# Patient Record
Sex: FEMALE | Race: WHITE | NOT HISPANIC OR LATINO
[De-identification: names, ages, dates, MRNs, and addresses within clinical notes are randomized per-mention and may not be internally consistent; named-entity substitution may affect disease eponyms.]

---

## 2018-07-06 ENCOUNTER — RESULT REVIEW (OUTPATIENT)
Age: 43
End: 2018-07-06

## 2018-11-09 ENCOUNTER — RESULT REVIEW (OUTPATIENT)
Age: 43
End: 2018-11-09

## 2019-05-30 ENCOUNTER — EMERGENCY (EMERGENCY)
Facility: HOSPITAL | Age: 44
LOS: 1 days | Discharge: HOME | End: 2019-05-30
Attending: EMERGENCY MEDICINE
Payer: COMMERCIAL

## 2019-05-30 VITALS
HEART RATE: 93 BPM | TEMPERATURE: 98 F | RESPIRATION RATE: 18 BRPM | SYSTOLIC BLOOD PRESSURE: 121 MMHG | OXYGEN SATURATION: 98 % | DIASTOLIC BLOOD PRESSURE: 79 MMHG

## 2019-05-30 VITALS
RESPIRATION RATE: 20 BRPM | HEART RATE: 110 BPM | TEMPERATURE: 98 F | OXYGEN SATURATION: 97 % | SYSTOLIC BLOOD PRESSURE: 126 MMHG | HEIGHT: 63 IN | DIASTOLIC BLOOD PRESSURE: 72 MMHG | WEIGHT: 130.07 LBS

## 2019-05-30 LAB
APPEARANCE UR: CLEAR — SIGNIFICANT CHANGE UP
BACTERIA # UR AUTO: NEGATIVE — SIGNIFICANT CHANGE UP
BILIRUB UR-MCNC: NEGATIVE — SIGNIFICANT CHANGE UP
COLOR SPEC: YELLOW — SIGNIFICANT CHANGE UP
DIFF PNL FLD: NEGATIVE — SIGNIFICANT CHANGE UP
EPI CELLS # UR: 2 /HPF — SIGNIFICANT CHANGE UP
GLUCOSE UR QL: NEGATIVE — SIGNIFICANT CHANGE UP
HYALINE CASTS # UR AUTO: 1 /LPF — SIGNIFICANT CHANGE UP (ref 0–2)
KETONES UR-MCNC: SIGNIFICANT CHANGE UP
LEUKOCYTE ESTERASE UR-ACNC: NEGATIVE — SIGNIFICANT CHANGE UP
NITRITE UR-MCNC: NEGATIVE — SIGNIFICANT CHANGE UP
PH UR: 6.5 — SIGNIFICANT CHANGE UP (ref 5–8)
PROT UR-MCNC: NEGATIVE — SIGNIFICANT CHANGE UP
RBC CASTS # UR COMP ASSIST: 4 /HPF — SIGNIFICANT CHANGE UP (ref 0–4)
SP GR SPEC: 1.01 — LOW (ref 1.01–1.02)
UROBILINOGEN FLD QL: NEGATIVE — SIGNIFICANT CHANGE UP
WBC UR QL: 1 /HPF — SIGNIFICANT CHANGE UP (ref 0–5)

## 2019-05-30 PROCEDURE — 51702 INSERT TEMP BLADDER CATH: CPT

## 2019-05-30 PROCEDURE — 99283 EMERGENCY DEPT VISIT LOW MDM: CPT

## 2019-05-30 PROCEDURE — 99283 EMERGENCY DEPT VISIT LOW MDM: CPT | Mod: 25

## 2019-05-30 PROCEDURE — 81001 URINALYSIS AUTO W/SCOPE: CPT

## 2019-05-30 NOTE — ED PROVIDER NOTE - ATTENDING CONTRIBUTION TO CARE
Pt with acute urinary retention for 4+ hours, worse with time, similar episode in past but not usually this long, due to fibroid.  No fever, vomiting.  Pt in severe distress due to pain.

## 2019-05-30 NOTE — ED ADULT NURSE NOTE - NSIMPLEMENTINTERV_GEN_ALL_ED
Implemented All Universal Safety Interventions:  [place] to call system. Call bell, personal items and telephone within reach. Instruct patient to call for assistance. Room bathroom lighting operational. Non-slip footwear when patient is off stretcher. Physically safe environment: no spills, clutter or unnecessary equipment. Stretcher in lowest position, wheels locked, appropriate side rails in place.

## 2019-05-30 NOTE — ED PROVIDER NOTE - NSFOLLOWUPCLINICS_GEN_ALL_ED_FT
[facility]  Gynceology/OB  [address] [place] [address]  Phone: [phone]  Fax:   Follow Up Time:     [facility]  Urology  [address] [place] [address] [place] [address]  Phone: [phone]  Fax:   Follow Up Time: 1-3 Days

## 2019-05-30 NOTE — ED ADULT NURSE NOTE - OBJECTIVE STATEMENT
43 YOF A&Ox3 with pmh of fibroids presents to ED for urinary retention of 4 hours. Patient states has had history of retention but unlike this. patient denies sob, chest pain, n/v/d, headaches & blurry vision. safety maintained.

## 2019-05-30 NOTE — ED PROVIDER NOTE - OBJECTIVE STATEMENT
42yo F with PMH known large uterine fibroid presenting with urinary retention x 4 hours. Pt reports retention in the past, has gone for and learned how to perform abd massages that typically resolves retention. Today unable to alleviate. Now c/o lower abd discomfort 2/2 retention. No fever/chills, no dysuria/hematuria prior. No kaur for this issue before. 42yo F with PMH known large uterine fibroid presenting with urinary retention x 4 hours. Pt reports retention in the past, has gone for and learned how to perform abd massages that typically resolves retention. Today unable to alleviate. Now c/o lower abd discomfort 2/2 retention. No fever/chills, no dysuria/hematuria prior. No kaur for this issue before. Reports she has been putting off having hysterectomy for many years not wanting to go through it, plans to f/u with GYN.

## 2019-05-30 NOTE — ED ADULT NURSE REASSESSMENT NOTE - NS ED NURSE REASSESS COMMENT FT1
Gaspar catheter placed using sterile technique. Draining to gravity. Secured w/ stat lock. Initial output off approx.  600 cc's urine. Sterile specimens collected and sent to lab. Patient tolerated procedure well. Will cont to monitor.

## 2019-05-30 NOTE — ED PROVIDER NOTE - NSFOLLOWUPINSTRUCTIONS_ED_ALL_ED_FT
Keep kaur catheter in place until follow up with Urologist.   Follow up with Urology upon discharge - Information provided.   Please follow up with your PCP and OBGYN upon discharge.   Return to ER for any fever/chills, new/worsening abdominal pain or back pain, vomiting, or any other concerns.

## 2019-06-06 PROBLEM — Z00.00 ENCOUNTER FOR PREVENTIVE HEALTH EXAMINATION: Status: ACTIVE | Noted: 2019-06-06

## 2019-06-07 ENCOUNTER — OUTPATIENT (OUTPATIENT)
Dept: OUTPATIENT SERVICES | Facility: HOSPITAL | Age: 44
LOS: 1 days | End: 2019-06-07
Payer: COMMERCIAL

## 2019-06-07 ENCOUNTER — APPOINTMENT (OUTPATIENT)
Dept: UROLOGY | Facility: CLINIC | Age: 44
End: 2019-06-07
Payer: COMMERCIAL

## 2019-06-07 VITALS
RESPIRATION RATE: 14 BRPM | WEIGHT: 132.06 LBS | OXYGEN SATURATION: 98 % | TEMPERATURE: 99 F | DIASTOLIC BLOOD PRESSURE: 81 MMHG | HEIGHT: 63 IN | SYSTOLIC BLOOD PRESSURE: 128 MMHG | HEART RATE: 89 BPM

## 2019-06-07 DIAGNOSIS — D25.9 LEIOMYOMA OF UTERUS, UNSPECIFIED: ICD-10-CM

## 2019-06-07 DIAGNOSIS — Z01.818 ENCOUNTER FOR OTHER PREPROCEDURAL EXAMINATION: ICD-10-CM

## 2019-06-07 DIAGNOSIS — Z80.0 FAMILY HISTORY OF MALIGNANT NEOPLASM OF DIGESTIVE ORGANS: ICD-10-CM

## 2019-06-07 DIAGNOSIS — Z87.42 PERSONAL HISTORY OF OTHER DISEASES OF THE FEMALE GENITAL TRACT: ICD-10-CM

## 2019-06-07 DIAGNOSIS — Z29.9 ENCOUNTER FOR PROPHYLACTIC MEASURES, UNSPECIFIED: ICD-10-CM

## 2019-06-07 DIAGNOSIS — Z98.890 OTHER SPECIFIED POSTPROCEDURAL STATES: Chronic | ICD-10-CM

## 2019-06-07 DIAGNOSIS — Z78.9 OTHER SPECIFIED HEALTH STATUS: ICD-10-CM

## 2019-06-07 PROBLEM — D21.9 BENIGN NEOPLASM OF CONNECTIVE AND OTHER SOFT TISSUE, UNSPECIFIED: Chronic | Status: ACTIVE | Noted: 2019-05-30

## 2019-06-07 LAB
ANION GAP SERPL CALC-SCNC: 14 MMOL/L — SIGNIFICANT CHANGE UP (ref 5–17)
BLD GP AB SCN SERPL QL: NEGATIVE — SIGNIFICANT CHANGE UP
BUN SERPL-MCNC: 10 MG/DL — SIGNIFICANT CHANGE UP (ref 7–23)
CALCIUM SERPL-MCNC: 9.4 MG/DL — SIGNIFICANT CHANGE UP (ref 8.4–10.5)
CHLORIDE SERPL-SCNC: 102 MMOL/L — SIGNIFICANT CHANGE UP (ref 96–108)
CO2 SERPL-SCNC: 23 MMOL/L — SIGNIFICANT CHANGE UP (ref 22–31)
CREAT SERPL-MCNC: 0.65 MG/DL — SIGNIFICANT CHANGE UP (ref 0.5–1.3)
GLUCOSE SERPL-MCNC: 133 MG/DL — HIGH (ref 70–99)
POTASSIUM SERPL-MCNC: 3.5 MMOL/L — SIGNIFICANT CHANGE UP (ref 3.5–5.3)
POTASSIUM SERPL-SCNC: 3.5 MMOL/L — SIGNIFICANT CHANGE UP (ref 3.5–5.3)
RH IG SCN BLD-IMP: POSITIVE — SIGNIFICANT CHANGE UP
SODIUM SERPL-SCNC: 139 MMOL/L — SIGNIFICANT CHANGE UP (ref 135–145)

## 2019-06-07 PROCEDURE — 86900 BLOOD TYPING SEROLOGIC ABO: CPT

## 2019-06-07 PROCEDURE — 86901 BLOOD TYPING SEROLOGIC RH(D): CPT

## 2019-06-07 PROCEDURE — 80048 BASIC METABOLIC PNL TOTAL CA: CPT

## 2019-06-07 PROCEDURE — 83036 HEMOGLOBIN GLYCOSYLATED A1C: CPT

## 2019-06-07 PROCEDURE — 99204 OFFICE O/P NEW MOD 45 MIN: CPT

## 2019-06-07 PROCEDURE — 86850 RBC ANTIBODY SCREEN: CPT

## 2019-06-07 PROCEDURE — G0463: CPT

## 2019-06-07 PROCEDURE — 85027 COMPLETE CBC AUTOMATED: CPT

## 2019-06-07 RX ORDER — CHLORHEXIDINE GLUCONATE 213 G/1000ML
1 SOLUTION TOPICAL DAILY
Refills: 0 | Status: DISCONTINUED | OUTPATIENT
Start: 2019-06-12 | End: 2019-06-12

## 2019-06-07 RX ORDER — SODIUM CHLORIDE 9 MG/ML
3 INJECTION INTRAMUSCULAR; INTRAVENOUS; SUBCUTANEOUS EVERY 8 HOURS
Refills: 0 | Status: DISCONTINUED | OUTPATIENT
Start: 2019-06-12 | End: 2019-06-12

## 2019-06-07 RX ORDER — LIDOCAINE HCL 20 MG/ML
0.2 VIAL (ML) INJECTION ONCE
Refills: 0 | Status: DISCONTINUED | OUTPATIENT
Start: 2019-06-12 | End: 2019-06-12

## 2019-06-07 RX ORDER — CEFOTETAN DISODIUM 1 G
2 VIAL (EA) INJECTION ONCE
Refills: 0 | Status: DISCONTINUED | OUTPATIENT
Start: 2019-06-12 | End: 2019-06-13

## 2019-06-07 NOTE — H&P PST ADULT - NSICDXPASTSURGICALHX_GEN_ALL_CORE_FT
PAST SURGICAL HISTORY:  H/O laparoscopy for egg retrieval    S/P laparoscopy for endometriosis    S/P lumpectomy, left breast benign

## 2019-06-07 NOTE — H&P PST ADULT - NSICDXPROBLEM_GEN_ALL_CORE_FT
PROBLEM DIAGNOSES  Problem: Need for prophylactic measure  Assessment and Plan: The Caprini score indicates this patient is at risk for a VTE event (score 3-5).  Most surgical patients in this group would benefit from pharmacologic prophylaxis.  The surgical team will determine the balance between VTE risk and bleeding risk     Problem: Leiomyoma of uterus  Assessment and Plan: exploratory laparotomy  GILLIAN  bilateral salpingectomy  cystoscopy insertion ureteral catheters  possible surgical staging

## 2019-06-07 NOTE — H&P PST ADULT - ASSESSMENT
CAPRINI SCORE    AGE RELATED RISK FACTORS                                                       MOBILITY RELATED FACTORS  [ x] Age 41-60 years                                            (1 Point)                  [ ] Bed rest                                                        (1 Point)  [ ] Age: 61-74 years                                           (2 Points)                [ ] Plaster cast                                                   (2 Points)  [ ] Age= 75 years                                              (3 Points)                 [ ] Bed bound for more than 72 hours                   (2 Points)    DISEASE RELATED RISK FACTORS                                               GENDER SPECIFIC FACTORS  [ ] Edema in the lower extremities                       (1 Point)                  [ ] Pregnancy                                                     (1 Point)  [ ] Varicose veins                                               (1 Point)                  [ ] Post-partum < 6 weeks                                   (1 Point)             [ ] BMI > 25 Kg/m2                                            (1 Point)                  [ ] Hormonal therapy  or oral contraception            (1 Point)                 [ ] Sepsis (in the previous month)                        (1 Point)                  [ ] History of pregnancy complications  [ ] Pneumonia or serious lung disease                                               [ ] Unexplained or recurrent                       (1 Point)           (in the previous month)                               (1 Point)  [ ] Abnormal pulmonary function test                     (1 Point)                 SURGERY RELATED RISK FACTORS  [ ] Acute myocardial infarction                              (1 Point)                 [ ]  Section                                            (1 Point)  [ ] Congestive heart failure (in the previous month)  (1 Point)                 [ ] Minor surgery                                                 (1 Point)   [ ] Inflammatory bowel disease                             (1 Point)                 [ ] Arthroscopic surgery                                        (2 Points)  [ ] Central venous access                                    (2 Points)                [ x] General surgery lasting more than 45 minutes   (2 Points)       [ ] Stroke (in the previous month)                          (5 Points)               [ ] Elective arthroplasty                                        (5 Points)                                                                                                                                               HEMATOLOGY RELATED FACTORS                                                 TRAUMA RELATED RISK FACTORS  [ ] Prior episodes of VTE                                     (3 Points)                 [ ] Fracture of the hip, pelvis, or leg                       (5 Points)  [ ] Positive family history for VTE                         (3 Points)                 [ ] Acute spinal cord injury (in the previous month)  (5 Points)  [ ] Prothrombin 32662 A                                      (3 Points)                 [ ] Paralysis  (less than 1 month)                          (5 Points)  [ ] Factor V Leiden                                             (3 Points)                 [ ] Multiple Trauma within 1 month                         (5 Points)  [ ] Lupus anticoagulants                                     (3 Points)                                                           [ ] Anticardiolipin antibodies                                (3 Points)                                                       [ ] High homocysteine in the blood                      (3 Points)                                             [ ] Other congenital or acquired thrombophilia       (3 Points)                                                [ ] Heparin induced thrombocytopenia                  (3 Points)                                          Total Score [    3      ]

## 2019-06-07 NOTE — H&P PST ADULT - HISTORY OF PRESENT ILLNESS
44yo F with PMH known large uterine fibroid presenting with urinary retention x 4 hours. Pt reports retention in the past,  has gone for and learned how to perform abd massages that typically resolves  retention. Today unable to alleviate. Now c/o lower abd discomfort 2/2  retention. No fever/chills, no dysuria/hematuria prior. No kaur for this issue  before. Reports she has been putting off having hysterectomy for many years not  wanting to go through it, plans to f/u with GYN. 44yo F with PMH known large uterine fibroid and urinary retention secondary to prolapsed uterus pressing on bladder.  5/30/2019 admitted to [facility] for urinary retention x 4 hours, s/p kaur catheter insertion. subsequently pt had follow up with OB, Dr. Barth who discontinued kaur catheter and collected urine culture, pt stated her urine culture positive for UTI, was started on 5 days course of macrobid since June 4th. pt currently denies symptoms of dysuria.  pt now presents to PST scheduled for GILLIAN, bilateral salpingectomy and cystoscopy insertion ureter catheters on 6/12.

## 2019-06-07 NOTE — H&P PST ADULT - LAST STRESS TEST
2016 WNL per pt (done due pt experienced palpitations at the time.  denies recurrence of palpitations)

## 2019-06-07 NOTE — H&P PST ADULT - NSANTHOSAYNRD_GEN_A_CORE
No. SCARLETT screening performed.  STOP BANG Legend: 0-2 = LOW Risk; 3-4 = INTERMEDIATE Risk; 5-8 = HIGH Risk

## 2019-06-08 LAB
HBA1C BLD-MCNC: 5.3 % — SIGNIFICANT CHANGE UP (ref 4–5.6)
HCT VFR BLD CALC: 43 % — SIGNIFICANT CHANGE UP (ref 34.5–45)
HGB BLD-MCNC: 14.1 G/DL — SIGNIFICANT CHANGE UP (ref 11.5–15.5)
MCHC RBC-ENTMCNC: 30.5 PG — SIGNIFICANT CHANGE UP (ref 27–34)
MCHC RBC-ENTMCNC: 32.8 GM/DL — SIGNIFICANT CHANGE UP (ref 32–36)
MCV RBC AUTO: 92.9 FL — SIGNIFICANT CHANGE UP (ref 80–100)
PLATELET # BLD AUTO: 294 K/UL — SIGNIFICANT CHANGE UP (ref 150–400)
RBC # BLD: 4.63 M/UL — SIGNIFICANT CHANGE UP (ref 3.8–5.2)
RBC # FLD: 13.4 % — SIGNIFICANT CHANGE UP (ref 10.3–14.5)
WBC # BLD: 6.92 K/UL — SIGNIFICANT CHANGE UP (ref 3.8–10.5)
WBC # FLD AUTO: 6.92 K/UL — SIGNIFICANT CHANGE UP (ref 3.8–10.5)

## 2019-06-11 ENCOUNTER — TRANSCRIPTION ENCOUNTER (OUTPATIENT)
Age: 44
End: 2019-06-11

## 2019-06-12 ENCOUNTER — RESULT REVIEW (OUTPATIENT)
Age: 44
End: 2019-06-12

## 2019-06-12 ENCOUNTER — INPATIENT (INPATIENT)
Facility: HOSPITAL | Age: 44
LOS: 1 days | Discharge: HOME | DRG: 743 | End: 2019-06-14
Attending: OBSTETRICS & GYNECOLOGY | Admitting: OBSTETRICS & GYNECOLOGY
Payer: COMMERCIAL

## 2019-06-12 VITALS
DIASTOLIC BLOOD PRESSURE: 85 MMHG | RESPIRATION RATE: 18 BRPM | SYSTOLIC BLOOD PRESSURE: 124 MMHG | HEART RATE: 105 BPM | TEMPERATURE: 98 F | OXYGEN SATURATION: 96 %

## 2019-06-12 DIAGNOSIS — D25.9 LEIOMYOMA OF UTERUS, UNSPECIFIED: ICD-10-CM

## 2019-06-12 DIAGNOSIS — Z98.890 OTHER SPECIFIED POSTPROCEDURAL STATES: Chronic | ICD-10-CM

## 2019-06-12 LAB
GLUCOSE BLDC GLUCOMTR-MCNC: 112 MG/DL — HIGH (ref 70–99)
HCG UR QL: NEGATIVE — SIGNIFICANT CHANGE UP

## 2019-06-12 PROCEDURE — 88331 PATH CONSLTJ SURG 1 BLK 1SPC: CPT | Mod: 26

## 2019-06-12 PROCEDURE — 88305 TISSUE EXAM BY PATHOLOGIST: CPT | Mod: 26

## 2019-06-12 PROCEDURE — 88304 TISSUE EXAM BY PATHOLOGIST: CPT | Mod: 26

## 2019-06-12 PROCEDURE — 52005 CYSTO W/URTRL CATHJ: CPT

## 2019-06-12 PROCEDURE — 88112 CYTOPATH CELL ENHANCE TECH: CPT | Mod: 26

## 2019-06-12 PROCEDURE — 88341 IMHCHEM/IMCYTCHM EA ADD ANTB: CPT | Mod: 26

## 2019-06-12 PROCEDURE — 88342 IMHCHEM/IMCYTCHM 1ST ANTB: CPT | Mod: 26

## 2019-06-12 PROCEDURE — 88307 TISSUE EXAM BY PATHOLOGIST: CPT | Mod: 26

## 2019-06-12 RX ORDER — HYDROMORPHONE HYDROCHLORIDE 2 MG/ML
30 INJECTION INTRAMUSCULAR; INTRAVENOUS; SUBCUTANEOUS
Refills: 0 | Status: DISCONTINUED | OUTPATIENT
Start: 2019-06-12 | End: 2019-06-13

## 2019-06-12 RX ORDER — FAMOTIDINE 10 MG/ML
20 INJECTION INTRAVENOUS ONCE
Refills: 0 | Status: COMPLETED | OUTPATIENT
Start: 2019-06-12 | End: 2019-06-12

## 2019-06-12 RX ORDER — ONDANSETRON 8 MG/1
4 TABLET, FILM COATED ORAL
Refills: 0 | Status: DISCONTINUED | OUTPATIENT
Start: 2019-06-12 | End: 2019-06-12

## 2019-06-12 RX ORDER — HEPARIN SODIUM 5000 [USP'U]/ML
5000 INJECTION INTRAVENOUS; SUBCUTANEOUS EVERY 12 HOURS
Refills: 0 | Status: DISCONTINUED | OUTPATIENT
Start: 2019-06-13 | End: 2019-06-14

## 2019-06-12 RX ORDER — ACETAMINOPHEN 500 MG
1000 TABLET ORAL ONCE
Refills: 0 | Status: COMPLETED | OUTPATIENT
Start: 2019-06-12 | End: 2019-06-12

## 2019-06-12 RX ORDER — HYDROMORPHONE HYDROCHLORIDE 2 MG/ML
0.5 INJECTION INTRAMUSCULAR; INTRAVENOUS; SUBCUTANEOUS
Refills: 0 | Status: DISCONTINUED | OUTPATIENT
Start: 2019-06-12 | End: 2019-06-13

## 2019-06-12 RX ORDER — METOCLOPRAMIDE HCL 10 MG
10 TABLET ORAL ONCE
Refills: 0 | Status: COMPLETED | OUTPATIENT
Start: 2019-06-12 | End: 2019-06-12

## 2019-06-12 RX ORDER — VITAMIN E 100 UNIT
1 CAPSULE ORAL
Qty: 0 | Refills: 0 | DISCHARGE

## 2019-06-12 RX ORDER — ACETAMINOPHEN 500 MG
975 TABLET ORAL ONCE
Refills: 0 | Status: COMPLETED | OUTPATIENT
Start: 2019-06-12 | End: 2019-06-12

## 2019-06-12 RX ORDER — DIPHENHYDRAMINE HCL 50 MG
25 CAPSULE ORAL EVERY 4 HOURS
Refills: 0 | Status: DISCONTINUED | OUTPATIENT
Start: 2019-06-12 | End: 2019-06-13

## 2019-06-12 RX ORDER — CELECOXIB 200 MG/1
200 CAPSULE ORAL ONCE
Refills: 0 | Status: COMPLETED | OUTPATIENT
Start: 2019-06-12 | End: 2019-06-12

## 2019-06-12 RX ORDER — HYDROMORPHONE HYDROCHLORIDE 2 MG/ML
0.5 INJECTION INTRAMUSCULAR; INTRAVENOUS; SUBCUTANEOUS
Refills: 0 | Status: DISCONTINUED | OUTPATIENT
Start: 2019-06-12 | End: 2019-06-12

## 2019-06-12 RX ORDER — ACETAMINOPHEN 500 MG
1000 TABLET ORAL ONCE
Refills: 0 | Status: COMPLETED | OUTPATIENT
Start: 2019-06-12 | End: 2020-05-10

## 2019-06-12 RX ORDER — PREGABALIN 225 MG/1
1 CAPSULE ORAL
Qty: 0 | Refills: 0 | DISCHARGE

## 2019-06-12 RX ORDER — DOCUSATE SODIUM 100 MG
100 CAPSULE ORAL THREE TIMES A DAY
Refills: 0 | Status: DISCONTINUED | OUTPATIENT
Start: 2019-06-12 | End: 2019-06-14

## 2019-06-12 RX ORDER — ONDANSETRON 8 MG/1
4 TABLET, FILM COATED ORAL EVERY 6 HOURS
Refills: 0 | Status: DISCONTINUED | OUTPATIENT
Start: 2019-06-12 | End: 2019-06-13

## 2019-06-12 RX ORDER — NALOXONE HYDROCHLORIDE 4 MG/.1ML
0.1 SPRAY NASAL
Refills: 0 | Status: DISCONTINUED | OUTPATIENT
Start: 2019-06-12 | End: 2019-06-13

## 2019-06-12 RX ORDER — SODIUM CHLORIDE 9 MG/ML
1000 INJECTION, SOLUTION INTRAVENOUS
Refills: 0 | Status: DISCONTINUED | OUTPATIENT
Start: 2019-06-12 | End: 2019-06-14

## 2019-06-12 RX ADMIN — SODIUM CHLORIDE 125 MILLILITER(S): 9 INJECTION, SOLUTION INTRAVENOUS at 18:22

## 2019-06-12 RX ADMIN — HYDROMORPHONE HYDROCHLORIDE 30 MILLILITER(S): 2 INJECTION INTRAMUSCULAR; INTRAVENOUS; SUBCUTANEOUS at 20:40

## 2019-06-12 RX ADMIN — Medication 10 MILLIGRAM(S): at 23:38

## 2019-06-12 RX ADMIN — CELECOXIB 200 MILLIGRAM(S): 200 CAPSULE ORAL at 12:17

## 2019-06-12 RX ADMIN — FAMOTIDINE 20 MILLIGRAM(S): 10 INJECTION INTRAVENOUS at 12:17

## 2019-06-12 RX ADMIN — Medication 1000 MILLIGRAM(S): at 22:37

## 2019-06-12 RX ADMIN — HYDROMORPHONE HYDROCHLORIDE 0.5 MILLIGRAM(S): 2 INJECTION INTRAMUSCULAR; INTRAVENOUS; SUBCUTANEOUS at 18:15

## 2019-06-12 RX ADMIN — Medication 400 MILLIGRAM(S): at 22:37

## 2019-06-12 RX ADMIN — FAMOTIDINE 20 MILLIGRAM(S): 10 INJECTION INTRAVENOUS at 21:00

## 2019-06-12 RX ADMIN — Medication 975 MILLIGRAM(S): at 12:15

## 2019-06-12 RX ADMIN — ONDANSETRON 4 MILLIGRAM(S): 8 TABLET, FILM COATED ORAL at 19:08

## 2019-06-12 RX ADMIN — HYDROMORPHONE HYDROCHLORIDE 30 MILLILITER(S): 2 INJECTION INTRAMUSCULAR; INTRAVENOUS; SUBCUTANEOUS at 22:14

## 2019-06-12 RX ADMIN — HYDROMORPHONE HYDROCHLORIDE 30 MILLILITER(S): 2 INJECTION INTRAMUSCULAR; INTRAVENOUS; SUBCUTANEOUS at 18:45

## 2019-06-12 NOTE — BRIEF OPERATIVE NOTE - NSICDXBRIEFPROCEDURE_GEN_ALL_CORE_FT
PROCEDURES:  Salpingectomy, bilateral 12-Jun-2019 17:41:00  Dylan Esocbar  Open supracervical hysterectomy 12-Jun-2019 17:40:35  Dylan Escobar PROCEDURES:  Exploratory laparotomy 12-Jun-2019 18:00:22  Dylan Escobar  Salpingectomy, bilateral 12-Jun-2019 17:41:00  Dylan Escobar  Open supracervical hysterectomy 12-Jun-2019 17:40:35  Dylan Escobar

## 2019-06-12 NOTE — PROGRESS NOTE ADULT - SUBJECTIVE AND OBJECTIVE BOX
POST-OP CHECK  PA Note    Allergies    latex (Rash)  No Known Drug Allergies    S: Pt awake and alert resting comfortably in bed  , c/o persistent nausea after receiving zofran, denies vomiting  Pain well controlled. Pt denies  SOB, CP, or palpitations. Denies passing Flatus.  Pt tolerating ice chips.  Gaspar in place    O:   T(C): 36.8 (06-12-19 @ 20:30), Max: 36.8 (06-12-19 @ 20:30)  HR: 85 (06-12-19 @ 20:30) (75 - 85)  BP: 103/56 (06-12-19 @ 20:30) (100/57 - 111/56)  RR: 13 (06-12-19 @ 20:30) (13 - 18)  SpO2: 95% (06-12-19 @ 20:30) (95% - 100%) on RA  I&O's Summary    12 Jun 2019 07:01  -  12 Jun 2019 20:43  --------------------------------------------------------  IN: 375 mL / OUT: 400 mL / NET: -25 mL                         OR           PACU  (I)              3500                IVF LR@125  (O)            750                  250cc 1st hour in pacu, 100cc second hr in pacu  EBL            500              CV: RRR  Lungs: CTA B/L  Abd: +BS, soft, appropriately tender  Inc: Clean/dry/intact - vertical covered by clean bandage  Ext: SCDs in place, Neg Homans B/L    A/P: 43y Female S/P Ex Laparatomy, supracervical hysterectomy, bilateral salpingectomy, cystoscopy,  and insertion of ureteral catheters POD #0  with  PAST MEDICAL & SURGICAL HISTORY:  H/O urinary retention  Leiomyoma of uterus  MVP (mitral valve prolapse)  Fibroid  S/P lumpectomy, left breast: benign  H/O laparoscopy: for egg retrieval  S/P laparoscopy: for endometriosis      -Neuro - AAO x 3, Pain well controlled  -Heme - clinically hemodynamically stable  -CV - asymptomatic, CBC in am  -Pulm - encourage IS, O2sat 95% on RA  -GI - Diet-  Clears as Tolerated  - - Gaspar to gravity  -DVT prophylaxis - SCDs in place, encourage ambulation  -Meds:   acetaminophen  IVPB .. 1000 milliGRAM(s) IV Intermittent once PRN  cefoTEtan  IVPB 2 Gram(s) IV Intermittent once  diphenhydrAMINE 25 milliGRAM(s) Oral every 4 hours PRN  docusate sodium 100 milliGRAM(s) Oral three times a day PRN  famotidine Injectable 20 milliGRAM(s) IV Push once  HYDROmorphone  Injectable 0.5 milliGRAM(s) IV Push every 10 minutes PRN  HYDROmorphone PCA (1 mG/mL) 30 milliLiter(s) PCA Continuous PCA Continuous  HYDROmorphone PCA (1 mG/mL) Rescue Clinician Bolus 0.5 milliGRAM(s) IV Push every 15 minutes PRN  lactated ringers. 1000 milliLiter(s) IV Continuous <Continuous>  naloxone Injectable 0.1 milliGRAM(s) IV Push every 3 minutes PRN  ondansetron Injectable 4 milliGRAM(s) IV Push every 6 hours PRN  ondansetron Injectable 4 milliGRAM(s) IV Push every 30 minutes PRN    -Dispo: stable for transfer from PACU, once meeting all PACU milestones    Karie Cm PA-C POST-OP CHECK  PA Note    Allergies    latex (Rash)  No Known Drug Allergies    S: Pt awake and alert resting comfortably in bed  , c/o persistent nausea after receiving zofran, denies vomiting  Pain well controlled. Pt denies  SOB, CP, or palpitations. Denies passing Flatus.  Pt tolerating ice chips.  Gaspar in place    O:   T(C): 36.8 (06-12-19 @ 20:30), Max: 36.8 (06-12-19 @ 20:30)  HR: 85 (06-12-19 @ 20:30) (75 - 85)  BP: 103/56 (06-12-19 @ 20:30) (100/57 - 111/56)  RR: 13 (06-12-19 @ 20:30) (13 - 18)  SpO2: 95% (06-12-19 @ 20:30) (95% - 100%) on RA  I&O's Summary    12 Jun 2019 07:01  -  12 Jun 2019 20:43  --------------------------------------------------------  IN: 375 mL / OUT: 400 mL / NET: -25 mL                         OR           PACU  (I)              3500                IVF LR@125  (O)            750                  250cc 1st hour in pacu, 100cc second hr in pacu  EBL            500              CV: RRR  Lungs: CTA B/L  Abd: +hypoactive BS, soft, appropriately tender  Inc: Clean/dry/intact - vertical covered by clean bandage  Ext: SCDs in place, Neg Homans B/L    A/P: 43y Female S/P Ex Laparatomy, supracervical hysterectomy, bilateral salpingectomy, cystoscopy,  and insertion of ureteral catheters POD #0  with  PAST MEDICAL & SURGICAL HISTORY:  H/O urinary retention  Leiomyoma of uterus  MVP (mitral valve prolapse)  Fibroid  S/P lumpectomy, left breast: benign  H/O laparoscopy: for egg retrieval  S/P laparoscopy: for endometriosis      -Neuro - AAO x 3, Pain well controlled  -Heme - clinically hemodynamically stable  -CV - asymptomatic, CBC in am  -Pulm - encourage IS, O2sat 95% on RA  -GI - Diet-  Clears as Tolerated  - - Gaspar to gravity  -DVT prophylaxis - SCDs in place, encourage ambulation  -Meds:   acetaminophen  IVPB .. 1000 milliGRAM(s) IV Intermittent once PRN  cefoTEtan  IVPB 2 Gram(s) IV Intermittent once  diphenhydrAMINE 25 milliGRAM(s) Oral every 4 hours PRN  docusate sodium 100 milliGRAM(s) Oral three times a day PRN  famotidine Injectable 20 milliGRAM(s) IV Push once  HYDROmorphone  Injectable 0.5 milliGRAM(s) IV Push every 10 minutes PRN  HYDROmorphone PCA (1 mG/mL) 30 milliLiter(s) PCA Continuous PCA Continuous  HYDROmorphone PCA (1 mG/mL) Rescue Clinician Bolus 0.5 milliGRAM(s) IV Push every 15 minutes PRN  lactated ringers. 1000 milliLiter(s) IV Continuous <Continuous>  naloxone Injectable 0.1 milliGRAM(s) IV Push every 3 minutes PRN  ondansetron Injectable 4 milliGRAM(s) IV Push every 6 hours PRN  ondansetron Injectable 4 milliGRAM(s) IV Push every 30 minutes PRN    -Dispo: stable for transfer from PACU, once meeting all PACU milestones    Karie Cm PA-C

## 2019-06-13 LAB
BASOPHILS # BLD AUTO: 0 K/UL — SIGNIFICANT CHANGE UP (ref 0–0.2)
BASOPHILS NFR BLD AUTO: 0.2 % — SIGNIFICANT CHANGE UP (ref 0–2)
EOSINOPHIL # BLD AUTO: 0 K/UL — SIGNIFICANT CHANGE UP (ref 0–0.5)
EOSINOPHIL NFR BLD AUTO: 0.2 % — SIGNIFICANT CHANGE UP (ref 0–6)
HCT VFR BLD CALC: 35.1 % — SIGNIFICANT CHANGE UP (ref 34.5–45)
HGB BLD-MCNC: 11.9 G/DL — SIGNIFICANT CHANGE UP (ref 11.5–15.5)
LYMPHOCYTES # BLD AUTO: 1.2 K/UL — SIGNIFICANT CHANGE UP (ref 1–3.3)
LYMPHOCYTES # BLD AUTO: 9.4 % — LOW (ref 13–44)
MCHC RBC-ENTMCNC: 31.2 PG — SIGNIFICANT CHANGE UP (ref 27–34)
MCHC RBC-ENTMCNC: 34 GM/DL — SIGNIFICANT CHANGE UP (ref 32–36)
MCV RBC AUTO: 91.6 FL — SIGNIFICANT CHANGE UP (ref 80–100)
MONOCYTES # BLD AUTO: 0.9 K/UL — SIGNIFICANT CHANGE UP (ref 0–0.9)
MONOCYTES NFR BLD AUTO: 6.7 % — SIGNIFICANT CHANGE UP (ref 2–14)
NEUTROPHILS # BLD AUTO: 10.7 K/UL — HIGH (ref 1.8–7.4)
NEUTROPHILS NFR BLD AUTO: 83.5 % — HIGH (ref 43–77)
PLATELET # BLD AUTO: 253 K/UL — SIGNIFICANT CHANGE UP (ref 150–400)
RBC # BLD: 3.83 M/UL — SIGNIFICANT CHANGE UP (ref 3.8–5.2)
RBC # FLD: 11.6 % — SIGNIFICANT CHANGE UP (ref 10.3–14.5)
WBC # BLD: 12.8 K/UL — HIGH (ref 3.8–10.5)
WBC # FLD AUTO: 12.8 K/UL — HIGH (ref 3.8–10.5)

## 2019-06-13 RX ORDER — HYDROMORPHONE HYDROCHLORIDE 2 MG/ML
0.5 INJECTION INTRAMUSCULAR; INTRAVENOUS; SUBCUTANEOUS
Refills: 0 | Status: DISCONTINUED | OUTPATIENT
Start: 2019-06-13 | End: 2019-06-14

## 2019-06-13 RX ORDER — ACETAMINOPHEN 500 MG
975 TABLET ORAL EVERY 6 HOURS
Refills: 0 | Status: DISCONTINUED | OUTPATIENT
Start: 2019-06-13 | End: 2019-06-14

## 2019-06-13 RX ORDER — HYDROMORPHONE HYDROCHLORIDE 2 MG/ML
30 INJECTION INTRAMUSCULAR; INTRAVENOUS; SUBCUTANEOUS
Refills: 0 | Status: DISCONTINUED | OUTPATIENT
Start: 2019-06-13 | End: 2019-06-14

## 2019-06-13 RX ORDER — ACETAMINOPHEN 500 MG
1000 TABLET ORAL ONCE
Refills: 0 | Status: COMPLETED | OUTPATIENT
Start: 2019-06-13 | End: 2019-06-13

## 2019-06-13 RX ORDER — OXYCODONE HYDROCHLORIDE 5 MG/1
5 TABLET ORAL
Refills: 0 | Status: DISCONTINUED | OUTPATIENT
Start: 2019-06-13 | End: 2019-06-14

## 2019-06-13 RX ORDER — NALOXONE HYDROCHLORIDE 4 MG/.1ML
0.1 SPRAY NASAL
Refills: 0 | Status: DISCONTINUED | OUTPATIENT
Start: 2019-06-13 | End: 2019-06-14

## 2019-06-13 RX ORDER — OXYCODONE HYDROCHLORIDE 5 MG/1
5 TABLET ORAL EVERY 6 HOURS
Refills: 0 | Status: DISCONTINUED | OUTPATIENT
Start: 2019-06-13 | End: 2019-06-14

## 2019-06-13 RX ORDER — ONDANSETRON 8 MG/1
4 TABLET, FILM COATED ORAL EVERY 6 HOURS
Refills: 0 | Status: DISCONTINUED | OUTPATIENT
Start: 2019-06-13 | End: 2019-06-14

## 2019-06-13 RX ORDER — IBUPROFEN 200 MG
600 TABLET ORAL EVERY 6 HOURS
Refills: 0 | Status: DISCONTINUED | OUTPATIENT
Start: 2019-06-13 | End: 2019-06-14

## 2019-06-13 RX ORDER — DIPHENHYDRAMINE HCL 50 MG
25 CAPSULE ORAL EVERY 4 HOURS
Refills: 0 | Status: DISCONTINUED | OUTPATIENT
Start: 2019-06-13 | End: 2019-06-14

## 2019-06-13 RX ADMIN — Medication 600 MILLIGRAM(S): at 23:00

## 2019-06-13 RX ADMIN — Medication 1000 MILLIGRAM(S): at 07:42

## 2019-06-13 RX ADMIN — Medication 975 MILLIGRAM(S): at 13:14

## 2019-06-13 RX ADMIN — Medication 400 MILLIGRAM(S): at 07:12

## 2019-06-13 RX ADMIN — Medication 975 MILLIGRAM(S): at 19:20

## 2019-06-13 RX ADMIN — HYDROMORPHONE HYDROCHLORIDE 30 MILLILITER(S): 2 INJECTION INTRAMUSCULAR; INTRAVENOUS; SUBCUTANEOUS at 07:12

## 2019-06-13 RX ADMIN — HEPARIN SODIUM 5000 UNIT(S): 5000 INJECTION INTRAVENOUS; SUBCUTANEOUS at 17:21

## 2019-06-13 RX ADMIN — Medication 600 MILLIGRAM(S): at 11:26

## 2019-06-13 RX ADMIN — Medication 600 MILLIGRAM(S): at 17:21

## 2019-06-13 RX ADMIN — HEPARIN SODIUM 5000 UNIT(S): 5000 INJECTION INTRAVENOUS; SUBCUTANEOUS at 05:50

## 2019-06-13 RX ADMIN — ONDANSETRON 4 MILLIGRAM(S): 8 TABLET, FILM COATED ORAL at 11:27

## 2019-06-13 RX ADMIN — HYDROMORPHONE HYDROCHLORIDE 30 MILLILITER(S): 2 INJECTION INTRAMUSCULAR; INTRAVENOUS; SUBCUTANEOUS at 19:21

## 2019-06-13 NOTE — PROGRESS NOTE ADULT - SUBJECTIVE AND OBJECTIVE BOX
Day 1 of Anesthesia Pain Management Service    SUBJECTIVE: I'm doing ok    Pain Scale Score:	[X] Refer to charted pain scores    THERAPY:    [ ] IV PCA Morphine		[ ] 5 mg/mL	[ ] 1 mg/mL  [X] IV PCA Hydromorphone	[ ] 5 mg/mL	[X] 1 mg/mL  [ ] IV PCA Fentanyl		[ ] 50 micrograms/mL    Demand dose: 0.2 mg     Lockout: 6 minutes   Continuous Rate: 0 mg/hr  4 Hour Limit: 4 mg    MEDICATIONS  (STANDING):  cefoTEtan  IVPB 2 Gram(s) IV Intermittent once  heparin  Injectable 5000 Unit(s) SubCutaneous every 12 hours  HYDROmorphone PCA (1 mG/mL) 30 milliLiter(s) PCA Continuous PCA Continuous  lactated ringers. 1000 milliLiter(s) (125 mL/Hr) IV Continuous <Continuous>    MEDICATIONS  (PRN):  diphenhydrAMINE 25 milliGRAM(s) Oral every 4 hours PRN Pruritus  docusate sodium 100 milliGRAM(s) Oral three times a day PRN Stool Softening  HYDROmorphone PCA (1 mG/mL) Rescue Clinician Bolus 0.5 milliGRAM(s) IV Push every 15 minutes PRN for Pain Scale GREATER THAN 6  naloxone Injectable 0.1 milliGRAM(s) IV Push every 3 minutes PRN For ANY of the following changes in patient status:  A. RR LESS THAN 10 breaths per minute, B. Oxygen saturation LESS THAN 90%, C. Sedation score of 6  ondansetron Injectable 4 milliGRAM(s) IV Push every 6 hours PRN Nausea      OBJECTIVE:    Sedation Score:	[ X] Alert 	[ ] Drowsy 	[ ] Arousable	[ ] Asleep	[ ] Unresponsive    Side Effects:	[X ] None	[ ] Nausea	[ ] Vomiting	[ ] Pruritus  		[ ] Other:    Vital Signs Last 24 Hrs  T(C): 37.2 (13 Jun 2019 09:06), Max: 37.2 (13 Jun 2019 09:06)  T(F): 98.9 (13 Jun 2019 09:06), Max: 98.9 (13 Jun 2019 09:06)  HR: 82 (13 Jun 2019 09:06) (75 - 105)  BP: 101/61 (13 Jun 2019 05:36) (100/57 - 124/85)  BP(mean): 73 (12 Jun 2019 20:00) (73 - 83)  RR: 16 (13 Jun 2019 09:06) (13 - 18)  SpO2: 98% (13 Jun 2019 09:06) (95% - 100%)    ASSESSMENT/ PLAN    Therapy to  be:               [X] Continued   [ ] Discontinued   [ ] Changed to PRN Analgesics    Documentation and Verification of current medications:   [X] Done	[ ] Not done, not eligible    Comments: Using 2-3x/hr. Nausea from yesterday resolved. Transition to po analgesics later toady

## 2019-06-13 NOTE — PROGRESS NOTE ADULT - SUBJECTIVE AND OBJECTIVE BOX
R3 Note    HD#2  POD#1    INTERVAL HPI/OVERNIGHT EVENTS: Pt seen and examined at bedside.  Pt complains of nausea overnight.  Not yet ambulating, not yet passing flatus, tolerating clears, pain controlled with analgesia, kaur in place  She denies nausea/vomiting/fever/chills/chest pain/SOB/dizziness.    MEDICATIONS  (STANDING):  cefoTEtan  IVPB 2 Gram(s) IV Intermittent once  heparin  Injectable 5000 Unit(s) SubCutaneous every 12 hours  HYDROmorphone PCA (1 mG/mL) 30 milliLiter(s) PCA Continuous PCA Continuous  lactated ringers. 1000 milliLiter(s) (125 mL/Hr) IV Continuous <Continuous>    MEDICATIONS  (PRN):  diphenhydrAMINE 25 milliGRAM(s) Oral every 4 hours PRN Pruritus  docusate sodium 100 milliGRAM(s) Oral three times a day PRN Stool Softening  HYDROmorphone PCA (1 mG/mL) Rescue Clinician Bolus 0.5 milliGRAM(s) IV Push every 15 minutes PRN for Pain Scale GREATER THAN 6  naloxone Injectable 0.1 milliGRAM(s) IV Push every 3 minutes PRN For ANY of the following changes in patient status:  A. RR LESS THAN 10 breaths per minute, B. Oxygen saturation LESS THAN 90%, C. Sedation score of 6  ondansetron Injectable 4 milliGRAM(s) IV Push every 6 hours PRN Nausea      12 point ROS negative except as outlined above    Vital Signs Last 24 Hrs  T(C): 36.9 (13 Jun 2019 05:36), Max: 36.9 (13 Jun 2019 05:36)  T(F): 98.4 (13 Jun 2019 05:36), Max: 98.4 (13 Jun 2019 05:36)  HR: 98 (13 Jun 2019 05:36) (75 - 105)  BP: 101/61 (13 Jun 2019 05:36) (100/57 - 124/85)  BP(mean): 73 (12 Jun 2019 20:00) (73 - 83)  RR: 16 (13 Jun 2019 05:36) (13 - 18)  SpO2: 97% (13 Jun 2019 05:36) (95% - 100%)    I&O's Summary    12 Jun 2019 07:01  -  13 Jun 2019 06:50  --------------------------------------------------------  IN: 1850 mL / OUT: 2000 mL / NET: -150 mL          PHYSICAL EXAM:    GA: NAD, A+0 x 3  CV: RRR  Pulm: CTA BL  Abd: ( + ) BS, soft, appropriately tender, nondistended, no rebound or guarding,   Incision: clean, dry and intact, dressing in place  : kaur in place, minimal blood on pad  Extremities: no swelling or calf tenderness  Lines:      LABS:                          11.9   12.8  )-----------( 253      ( 13 Jun 2019 05:57 )             35.1   baso 0.2    eos 0.2    imm gran x      lymph 9.4    mono 6.7    poly 83.5

## 2019-06-13 NOTE — PROGRESS NOTE ADULT - SUBJECTIVE AND OBJECTIVE BOX
Pain Management Attending Addendum    SUBJECTIVE: Patient doing well with IV PCA    Therapy:    [X] IV PCA         [ ] PRN Analgesics    OBJECTIVE:   [X] Pain appropriately controlled    [ ] Other:    Side Effects:  [X] None	             [ ] Nausea              [ ] Pruritis                	[ ] Other:    ASSESSMENT/PLAN: Continue current therapy    Comments: Tolerating Reg Diet.  Decreased nausea.  Plan to transition to PO meds this PM or tmw AM.

## 2019-06-14 ENCOUNTER — TRANSCRIPTION ENCOUNTER (OUTPATIENT)
Age: 44
End: 2019-06-14

## 2019-06-14 VITALS
TEMPERATURE: 98 F | OXYGEN SATURATION: 99 % | SYSTOLIC BLOOD PRESSURE: 121 MMHG | DIASTOLIC BLOOD PRESSURE: 73 MMHG | RESPIRATION RATE: 18 BRPM | HEART RATE: 72 BPM

## 2019-06-14 LAB
HCT VFR BLD CALC: 33.8 % — LOW (ref 34.5–45)
HGB BLD-MCNC: 11 G/DL — LOW (ref 11.5–15.5)
MCHC RBC-ENTMCNC: 30.4 PG — SIGNIFICANT CHANGE UP (ref 27–34)
MCHC RBC-ENTMCNC: 32.6 GM/DL — SIGNIFICANT CHANGE UP (ref 32–36)
MCV RBC AUTO: 93.3 FL — SIGNIFICANT CHANGE UP (ref 80–100)
PLATELET # BLD AUTO: 254 K/UL — SIGNIFICANT CHANGE UP (ref 150–400)
RBC # BLD: 3.62 M/UL — LOW (ref 3.8–5.2)
RBC # FLD: 12.3 % — SIGNIFICANT CHANGE UP (ref 10.3–14.5)
WBC # BLD: 10.2 K/UL — SIGNIFICANT CHANGE UP (ref 3.8–10.5)
WBC # FLD AUTO: 10.2 K/UL — SIGNIFICANT CHANGE UP (ref 3.8–10.5)

## 2019-06-14 RX ORDER — NITROFURANTOIN MACROCRYSTAL 50 MG
1 CAPSULE ORAL
Qty: 0 | Refills: 0 | DISCHARGE

## 2019-06-14 RX ORDER — IBUPROFEN 200 MG
1 TABLET ORAL
Qty: 0 | Refills: 0 | DISCHARGE
Start: 2019-06-14

## 2019-06-14 RX ORDER — SODIUM CHLORIDE 9 MG/ML
3 INJECTION INTRAMUSCULAR; INTRAVENOUS; SUBCUTANEOUS EVERY 8 HOURS
Refills: 0 | Status: DISCONTINUED | OUTPATIENT
Start: 2019-06-14 | End: 2019-06-14

## 2019-06-14 RX ORDER — ACETAMINOPHEN 500 MG
3 TABLET ORAL
Qty: 0 | Refills: 0 | DISCHARGE
Start: 2019-06-14

## 2019-06-14 RX ORDER — OXYCODONE HYDROCHLORIDE 5 MG/1
1 TABLET ORAL
Qty: 24 | Refills: 0
Start: 2019-06-14 | End: 2019-06-19

## 2019-06-14 RX ADMIN — Medication 975 MILLIGRAM(S): at 09:06

## 2019-06-14 RX ADMIN — Medication 975 MILLIGRAM(S): at 02:04

## 2019-06-14 RX ADMIN — HEPARIN SODIUM 5000 UNIT(S): 5000 INJECTION INTRAVENOUS; SUBCUTANEOUS at 06:20

## 2019-06-14 RX ADMIN — Medication 975 MILLIGRAM(S): at 08:36

## 2019-06-14 RX ADMIN — Medication 600 MILLIGRAM(S): at 06:20

## 2019-06-14 NOTE — PROGRESS NOTE ADULT - SUBJECTIVE AND OBJECTIVE BOX
Patient seen and examined at bedside, no acute overnight events. No acute complaints, pain well controlled. Patient is ambulating, passing flatus, voiding spontaneously, and tolerating regular diet. Denies CP, SOB, N/V, fevers, and chills. Pt reports not using the PCA >24h and is only requiring PO pain meds.    Vital Signs Last 24 Hours  T(C): 36.9 (06-14-19 @ 05:52), Max: 37.2 (06-13-19 @ 09:06)  HR: 69 (06-14-19 @ 05:52) (69 - 82)  BP: 114/76 (06-14-19 @ 05:52) (103/63 - 129/83)  RR: 18 (06-14-19 @ 05:52) (16 - 18)  SpO2: 99% (06-14-19 @ 05:52) (96% - 99%)    I&O's Detail    12 Jun 2019 07:01  -  13 Jun 2019 07:00  --------------------------------------------------------  IN:    IV PiggyBack: 100 mL    lactated ringers.: 1750 mL  Total IN: 1850 mL    OUT:    Indwelling Catheter - Urethral: 2000 mL  Total OUT: 2000 mL    Total NET: -150 mL      13 Jun 2019 07:01  -  14 Jun 2019 06:39  --------------------------------------------------------  IN:    lactated ringers.: 1120 mL    Oral Fluid: 1210 mL  Total IN: 2330 mL    OUT:    Indwelling Catheter - Urethral: 1000 mL    Voided: 2700 mL  Total OUT: 3700 mL    Total NET: -1370 mL          Physical Exam:  General: NAD  CV: NR, RR, S1, S2, no M/R/G  Lungs: CTA-B  Abdomen: Soft, non-tender, non-distended, +BS  Incision: _ CDI  Ext: No pain or swelling    Labs:             11.9   12.8<H> )-----------( 253      ( 06-13 @ 05:57 )             35.1         MEDICATIONS  (STANDING):  acetaminophen   Tablet .. 975 milliGRAM(s) Oral every 6 hours  heparin  Injectable 5000 Unit(s) SubCutaneous every 12 hours  HYDROmorphone PCA (1 mG/mL) 30 milliLiter(s) PCA Continuous PCA Continuous  ibuprofen  Tablet. 600 milliGRAM(s) Oral every 6 hours  lactated ringers. 1000 milliLiter(s) (30 mL/Hr) IV Continuous <Continuous>    MEDICATIONS  (PRN):  diphenhydrAMINE 25 milliGRAM(s) Oral every 4 hours PRN Pruritus  docusate sodium 100 milliGRAM(s) Oral three times a day PRN Stool Softening  HYDROmorphone PCA (1 mG/mL) Rescue Clinician Bolus 0.5 milliGRAM(s) IV Push every 15 minutes PRN for Pain Scale GREATER THAN 6  naloxone Injectable 0.1 milliGRAM(s) IV Push every 3 minutes PRN For ANY of the following changes in patient status:  A. RR LESS THAN 10 breaths per minute, B. Oxygen saturation LESS THAN 90%, C. Sedation score of 6  ondansetron Injectable 4 milliGRAM(s) IV Push every 6 hours PRN Nausea  oxyCODONE    IR 5 milliGRAM(s) Oral every 6 hours PRN Moderate Pain (4 - 6)  oxyCODONE    IR 5 milliGRAM(s) Oral every 3 hours PRN Severe Pain (7 - 10) Patient seen and examined at bedside, no acute overnight events. No acute complaints, pain well controlled. Patient is ambulating, passing flatus, voiding spontaneously, and tolerating regular diet. Denies CP, SOB, N/V, fevers, and chills. Pt reports not using the PCA >24h and is only requiring PO pain meds.    Vital Signs Last 24 Hours  T(C): 36.9 (06-14-19 @ 05:52), Max: 37.2 (06-13-19 @ 09:06)  HR: 69 (06-14-19 @ 05:52) (69 - 82)  BP: 114/76 (06-14-19 @ 05:52) (103/63 - 129/83)  RR: 18 (06-14-19 @ 05:52) (16 - 18)  SpO2: 99% (06-14-19 @ 05:52) (96% - 99%)    I&O's Detail    12 Jun 2019 07:01  -  13 Jun 2019 07:00  --------------------------------------------------------  IN:    IV PiggyBack: 100 mL    lactated ringers.: 1750 mL  Total IN: 1850 mL    OUT:    Indwelling Catheter - Urethral: 2000 mL  Total OUT: 2000 mL    Total NET: -150 mL      13 Jun 2019 07:01  -  14 Jun 2019 06:39  --------------------------------------------------------  IN:    lactated ringers.: 1120 mL    Oral Fluid: 1210 mL  Total IN: 2330 mL    OUT:    Indwelling Catheter - Urethral: 1000 mL    Voided: 2700 mL  Total OUT: 3700 mL    Total NET: -1370 mL          Physical Exam:  General: NAD  CV: NR, RR, S1, S2, no M/R/G  Lungs: CTA-B  Abdomen: Soft, non-tender, non-distended, +BS  Incision: vertical incision, steris CDI  Ext: No pain or swelling    Labs:             11.9   12.8<H> )-----------( 253      ( 06-13 @ 05:57 )             35.1         MEDICATIONS  (STANDING):  acetaminophen   Tablet .. 975 milliGRAM(s) Oral every 6 hours  heparin  Injectable 5000 Unit(s) SubCutaneous every 12 hours  HYDROmorphone PCA (1 mG/mL) 30 milliLiter(s) PCA Continuous PCA Continuous  ibuprofen  Tablet. 600 milliGRAM(s) Oral every 6 hours  lactated ringers. 1000 milliLiter(s) (30 mL/Hr) IV Continuous <Continuous>    MEDICATIONS  (PRN):  diphenhydrAMINE 25 milliGRAM(s) Oral every 4 hours PRN Pruritus  docusate sodium 100 milliGRAM(s) Oral three times a day PRN Stool Softening  HYDROmorphone PCA (1 mG/mL) Rescue Clinician Bolus 0.5 milliGRAM(s) IV Push every 15 minutes PRN for Pain Scale GREATER THAN 6  naloxone Injectable 0.1 milliGRAM(s) IV Push every 3 minutes PRN For ANY of the following changes in patient status:  A. RR LESS THAN 10 breaths per minute, B. Oxygen saturation LESS THAN 90%, C. Sedation score of 6  ondansetron Injectable 4 milliGRAM(s) IV Push every 6 hours PRN Nausea  oxyCODONE    IR 5 milliGRAM(s) Oral every 6 hours PRN Moderate Pain (4 - 6)  oxyCODONE    IR 5 milliGRAM(s) Oral every 3 hours PRN Severe Pain (7 - 10)

## 2019-06-14 NOTE — PROGRESS NOTE ADULT - PROBLEM SELECTOR PLAN 1
Neuro: transition to po pain meds, if tolerating breakfast  CV: Hemodynamically stable  Pulm: Saturating well on room air, encourage incentive spirometry  GI: Advance to regular diet  : UOP adequate, d/c vincent  Heme: c/w HSQ and SCDs for DVT ppx  Dispo: Continue routine post-op care    Dylan Escobar MD PGY2
Neuro: c/w po pain meds, d/c PCA (pt not using)  CV: Hemodynamically stable  Pulm: Saturating well on room air, encourage oob/amb  GI: c/w regular diet  : voiding spontaneously  Heme: c/w HSQ and SCDs for DVT ppx, am CBC  Dispo: consider d/c home later today

## 2019-06-14 NOTE — DISCHARGE NOTE NURSING/CASE MANAGEMENT/SOCIAL WORK - NSDCPNINST_GEN_ALL_CORE
If you have fever greater than 100.3, drainage from incision site or unable to urinate contact MD office and return to ER

## 2019-06-14 NOTE — DISCHARGE NOTE PROVIDER - CARE PROVIDER_API CALL
Nicki Barth)  Obstetrics and Gynecology  [address] [id number]  Phone: [phone]  Fax: [phone]  Follow Up Time:

## 2019-06-14 NOTE — DISCHARGE NOTE PROVIDER - HOSPITAL COURSE
42yo POD1 S/P Ex-lap, supracervical hysterectomy, bilateral salpingectomy, cystoscopy,  and insertion of ureteral catheters. See operative note for details. POD1 Hct trended from 43 -> 35.1 appropriately.  On day of discharge, patient voiding, tolerating PO, and ambulating. Patient to follow up with Dr. Barth in 2 weeks.

## 2019-06-14 NOTE — DISCHARGE NOTE NURSING/CASE MANAGEMENT/SOCIAL WORK - NSDCDPATPORTLINK_GEN_ALL_CORE
You can access the [facility] Patient Portal, offered by [facility], by registering with the following website: [email or web address]

## 2019-06-14 NOTE — PROGRESS NOTE ADULT - ASSESSMENT
Pt is a 44 yo POD#1 status post exploratory laparotomy, Supracervical Hysterectomy, bilateral salpingectomy, b/l u-cath placement and removal, and cystoscopy, recovering appropriately.
A/P: 43y Female POD1 S/P Ex-lap, supracervical hysterectomy, bilateral salpingectomy, cystoscopy,  and insertion of ureteral catheters. Patient voiding and ambulating. Tolerating PO. Stable and doing well.
pod 2 s/p xlap palmer, bs doing well and wants to go home  stable for d-c, full d-c inst given, f/u in office 1 week

## 2019-06-14 NOTE — PROGRESS NOTE ADULT - SUBJECTIVE AND OBJECTIVE BOX
Day 2 of Anesthesia Pain Management Service    SUBJECTIVE: Doing well    Pain Scale Score:	[X] Refer to charted pain scores    THERAPY:    [ ] IV PCA Morphine		        [ ] 5 mg/mL	[ ] 1 mg/mL  [X] IV PCA Hydromorphone	[ ] 5 mg/mL	[X] 1 mg/mL  [ ] IV PCA Fentanyl		        [ ] 50 micrograms/mL    Demand dose: 0.2 mg     Lockout: 6 minutes   Continuous Rate: 0 mg/hr  4 Hour Limit: 4 mg    MEDICATIONS  (STANDING):  acetaminophen   Tablet .. 975 milliGRAM(s) Oral every 6 hours  heparin  Injectable 5000 Unit(s) SubCutaneous every 12 hours  ibuprofen  Tablet. 600 milliGRAM(s) Oral every 6 hours  sodium chloride 0.9% lock flush 3 milliLiter(s) IV Push every 8 hours    MEDICATIONS  (PRN):  diphenhydrAMINE 25 milliGRAM(s) Oral every 4 hours PRN Pruritus  docusate sodium 100 milliGRAM(s) Oral three times a day PRN Stool Softening  HYDROmorphone PCA (1 mG/mL) Rescue Clinician Bolus 0.5 milliGRAM(s) IV Push every 15 minutes PRN for Pain Scale GREATER THAN 6  naloxone Injectable 0.1 milliGRAM(s) IV Push every 3 minutes PRN For ANY of the following changes in patient status:  A. RR LESS THAN 10 breaths per minute, B. Oxygen saturation LESS THAN 90%, C. Sedation score of 6  ondansetron Injectable 4 milliGRAM(s) IV Push every 6 hours PRN Nausea  oxyCODONE    IR 5 milliGRAM(s) Oral every 6 hours PRN Moderate Pain (4 - 6)  oxyCODONE    IR 5 milliGRAM(s) Oral every 3 hours PRN Severe Pain (7 - 10)      OBJECTIVE:    Sedation Score:	[ X] Alert	 [ ] Drowsy 	[ ] Arousable	[ ] Asleep	[ ] Unresponsive    Side Effects:	[X ] None	[ ] Nausea	[ ] Vomiting	[ ] Pruritus  		[ ] Other:    Vital Signs Last 24 Hrs  T(C): 36.9 (14 Jun 2019 09:41), Max: 37 (13 Jun 2019 21:14)  T(F): 98.4 (14 Jun 2019 09:41), Max: 98.6 (13 Jun 2019 21:14)  HR: 72 (14 Jun 2019 09:41) (69 - 79)  BP: 121/73 (14 Jun 2019 09:41) (103/63 - 129/83)  BP(mean): --  RR: 18 (14 Jun 2019 09:41) (17 - 18)  SpO2: 99% (14 Jun 2019 09:41) (96% - 99%)    ASSESSMENT/ PLAN    Therapy to  be:               [  ] Continued   [X ] Discontinued   [ X] Changed to PRN Analgesics    Documentation and Verification of current medications:   [X] Done	[ ] Not done, not eligible    Comments: PCA D\C'd by surgical service

## 2019-06-14 NOTE — PROGRESS NOTE ADULT - SUBJECTIVE AND OBJECTIVE BOX
Pt seen and examined at bedside. Pt states mild abdominal pain. Pt is ambulating, tolerating reg diet, [passing] flatus, [+]BM, [+] urinating adequately.   Pt denies fever, chills, chest pain, SOB, nausea, vomiting, lightheadedness, dizziness.      T(F): 98.4 (06-14-19 @ 05:52), Max: 98.6 (06-13-19 @ 21:14)  HR: 69 (06-14-19 @ 05:52) (69 - 79)  BP: 114/76 (06-14-19 @ 05:52) (103/63 - 129/83)  RR: 18 (06-14-19 @ 05:52) (17 - 18)  SpO2: 99% (06-14-19 @ 05:52) (96% - 99%)  Wt(kg): --  I&O's Summary    13 Jun 2019 07:01  -  14 Jun 2019 07:00  --------------------------------------------------------  IN: 2330 mL / OUT: 4100 mL / NET: -1770 mL        MEDICATIONS  (STANDING):  acetaminophen   Tablet .. 975 milliGRAM(s) Oral every 6 hours  heparin  Injectable 5000 Unit(s) SubCutaneous every 12 hours  ibuprofen  Tablet. 600 milliGRAM(s) Oral every 6 hours  sodium chloride 0.9% lock flush 3 milliLiter(s) IV Push every 8 hours    MEDICATIONS  (PRN):  diphenhydrAMINE 25 milliGRAM(s) Oral every 4 hours PRN Pruritus  docusate sodium 100 milliGRAM(s) Oral three times a day PRN Stool Softening  HYDROmorphone PCA (1 mG/mL) Rescue Clinician Bolus 0.5 milliGRAM(s) IV Push every 15 minutes PRN for Pain Scale GREATER THAN 6  naloxone Injectable 0.1 milliGRAM(s) IV Push every 3 minutes PRN For ANY of the following changes in patient status:  A. RR LESS THAN 10 breaths per minute, B. Oxygen saturation LESS THAN 90%, C. Sedation score of 6  ondansetron Injectable 4 milliGRAM(s) IV Push every 6 hours PRN Nausea  oxyCODONE    IR 5 milliGRAM(s) Oral every 6 hours PRN Moderate Pain (4 - 6)  oxyCODONE    IR 5 milliGRAM(s) Oral every 3 hours PRN Severe Pain (7 - 10)      Physical Exam:  Constitutional: NAD  Pulmonary: clear to auscultation bilaterally   Cardiovascular: Regular rate and rhythm   Abdomen: incision site clean, dry, intact. Soft, mildly tender, [] distended, no guarding, no rebound, [] bowel sounds  Extremities: no lower extremity edema or calve tenderness. SCDs in place     LABS:                        11.0   10.2  )-----------( 254      ( 14 Jun 2019 06:30 )             33.8                 RADIOLOGY & ADDITIONAL TESTS:

## 2019-06-19 LAB — SURGICAL PATHOLOGY STUDY: SIGNIFICANT CHANGE UP

## 2019-06-24 LAB — NON-GYNECOLOGICAL CYTOLOGY STUDY: SIGNIFICANT CHANGE UP

## 2019-06-25 PROCEDURE — 85027 COMPLETE CBC AUTOMATED: CPT

## 2019-06-25 PROCEDURE — C1889: CPT

## 2019-06-25 PROCEDURE — 88305 TISSUE EXAM BY PATHOLOGIST: CPT

## 2019-06-25 PROCEDURE — 88307 TISSUE EXAM BY PATHOLOGIST: CPT

## 2019-06-25 PROCEDURE — C1769: CPT

## 2019-06-25 PROCEDURE — 81025 URINE PREGNANCY TEST: CPT

## 2019-06-25 PROCEDURE — 88112 CYTOPATH CELL ENHANCE TECH: CPT

## 2019-06-25 PROCEDURE — 82962 GLUCOSE BLOOD TEST: CPT

## 2019-06-25 PROCEDURE — 88342 IMHCHEM/IMCYTCHM 1ST ANTB: CPT

## 2019-06-25 PROCEDURE — C1765: CPT

## 2019-06-25 PROCEDURE — 88331 PATH CONSLTJ SURG 1 BLK 1SPC: CPT

## 2019-06-25 PROCEDURE — C1758: CPT

## 2019-06-25 PROCEDURE — 88304 TISSUE EXAM BY PATHOLOGIST: CPT

## 2019-06-25 PROCEDURE — 88341 IMHCHEM/IMCYTCHM EA ADD ANTB: CPT

## 2019-08-12 ENCOUNTER — RESULT REVIEW (OUTPATIENT)
Age: 44
End: 2019-08-12

## 2020-07-06 PROBLEM — Z87.898 PERSONAL HISTORY OF OTHER SPECIFIED CONDITIONS: Chronic | Status: ACTIVE | Noted: 2019-06-07

## 2020-07-06 PROBLEM — I34.1 NONRHEUMATIC MITRAL (VALVE) PROLAPSE: Chronic | Status: ACTIVE | Noted: 2019-06-07

## 2020-07-06 PROBLEM — D25.9 LEIOMYOMA OF UTERUS, UNSPECIFIED: Chronic | Status: ACTIVE | Noted: 2019-06-07

## 2020-08-05 ENCOUNTER — APPOINTMENT (OUTPATIENT)
Dept: ULTRASOUND IMAGING | Facility: IMAGING CENTER | Age: 45
End: 2020-08-05
Payer: COMMERCIAL

## 2020-08-05 ENCOUNTER — OUTPATIENT (OUTPATIENT)
Dept: OUTPATIENT SERVICES | Facility: HOSPITAL | Age: 45
LOS: 1 days | End: 2020-08-05
Payer: COMMERCIAL

## 2020-08-05 ENCOUNTER — APPOINTMENT (OUTPATIENT)
Dept: MAMMOGRAPHY | Facility: IMAGING CENTER | Age: 45
End: 2020-08-05
Payer: COMMERCIAL

## 2020-08-05 DIAGNOSIS — Z98.890 OTHER SPECIFIED POSTPROCEDURAL STATES: Chronic | ICD-10-CM

## 2020-08-05 DIAGNOSIS — Z00.8 ENCOUNTER FOR OTHER GENERAL EXAMINATION: ICD-10-CM

## 2020-08-05 PROCEDURE — 77067 SCR MAMMO BI INCL CAD: CPT | Mod: 26

## 2020-08-05 PROCEDURE — 76641 ULTRASOUND BREAST COMPLETE: CPT | Mod: 26,50

## 2020-08-05 PROCEDURE — 77063 BREAST TOMOSYNTHESIS BI: CPT | Mod: 26

## 2020-08-05 PROCEDURE — 77063 BREAST TOMOSYNTHESIS BI: CPT

## 2020-08-05 PROCEDURE — 76641 ULTRASOUND BREAST COMPLETE: CPT

## 2020-08-05 PROCEDURE — 77067 SCR MAMMO BI INCL CAD: CPT

## 2020-08-08 ENCOUNTER — APPOINTMENT (OUTPATIENT)
Dept: ULTRASOUND IMAGING | Facility: IMAGING CENTER | Age: 45
End: 2020-08-08
Payer: COMMERCIAL

## 2020-08-08 ENCOUNTER — OUTPATIENT (OUTPATIENT)
Dept: OUTPATIENT SERVICES | Facility: HOSPITAL | Age: 45
LOS: 1 days | End: 2020-08-08
Payer: COMMERCIAL

## 2020-08-08 DIAGNOSIS — Z98.890 OTHER SPECIFIED POSTPROCEDURAL STATES: Chronic | ICD-10-CM

## 2020-08-08 DIAGNOSIS — Z00.8 ENCOUNTER FOR OTHER GENERAL EXAMINATION: ICD-10-CM

## 2020-08-08 PROCEDURE — 76642 ULTRASOUND BREAST LIMITED: CPT | Mod: 26,LT

## 2020-08-08 PROCEDURE — 76642 ULTRASOUND BREAST LIMITED: CPT

## 2020-08-13 ENCOUNTER — RESULT REVIEW (OUTPATIENT)
Age: 45
End: 2020-08-13

## 2021-01-08 NOTE — ED ADULT NURSE NOTE - CCCP TRG CHIEF CMPLNT
urinary retention [employer] Counseling:  I discussed with the patient the risks of mycophenolate mofetil including but not limited to infection/immunosuppression, GI upset, hypokalemia, hypercholesterolemia, bone marrow suppression, lymphoproliferative disorders, malignancy, GI ulceration/bleed/perforation, colitis, interstitial lung disease, kidney failure, progressive multifocal leukoencephalopathy, and birth defects.  The patient understands that monitoring is required including a baseline creatinine and regular CBC testing. In addition, patient must alert us immediately if symptoms of infection or other concerning signs are noted.

## 2021-08-07 NOTE — H&P PST ADULT - PRO ARRIVE FROM
home DO Brooks PGY-3: patient found to have elevated INR, will DC and tell to hold for 2 days and strict f/u with PMD for INR check after 2 days. Brooks DO PGY-3: patient found to have elevated INR, no bleeding since this morning, will DC and tell to hold for 2 days and strict f/u with PMD for INR check after 2 days.

## 2021-11-10 DIAGNOSIS — D25.9 LEIOMYOMA OF UTERUS, UNSPECIFIED: ICD-10-CM

## 2021-11-11 ENCOUNTER — APPOINTMENT (OUTPATIENT)
Dept: OBGYN | Facility: CLINIC | Age: 46
End: 2021-11-11
Payer: COMMERCIAL

## 2021-11-11 ENCOUNTER — ASOB RESULT (OUTPATIENT)
Age: 46
End: 2021-11-11

## 2021-11-11 VITALS
WEIGHT: 127 LBS | DIASTOLIC BLOOD PRESSURE: 70 MMHG | HEIGHT: 63 IN | SYSTOLIC BLOOD PRESSURE: 120 MMHG | BODY MASS INDEX: 22.5 KG/M2

## 2021-11-11 DIAGNOSIS — F41.9 ANXIETY DISORDER, UNSPECIFIED: ICD-10-CM

## 2021-11-11 DIAGNOSIS — E04.1 NONTOXIC SINGLE THYROID NODULE: ICD-10-CM

## 2021-11-11 DIAGNOSIS — D24.9 BENIGN NEOPLASM OF UNSPECIFIED BREAST: ICD-10-CM

## 2021-11-11 DIAGNOSIS — D21.9 BENIGN NEOPLASM OF CONNECTIVE AND OTHER SOFT TISSUE, UNSPECIFIED: ICD-10-CM

## 2021-11-11 PROCEDURE — 99396 PREV VISIT EST AGE 40-64: CPT

## 2021-11-11 PROCEDURE — 82270 OCCULT BLOOD FECES: CPT

## 2021-11-11 PROCEDURE — 76830 TRANSVAGINAL US NON-OB: CPT

## 2021-11-11 NOTE — HISTORY OF PRESENT ILLNESS
[FreeTextEntry1] : 2021. 46 year old female  LMP s/p QUAN/ b/l salpingectomy, PMH anxiety She presents for annual gyn exam and offers no complaints. Denies vag bleeding, abnormal vaginal discharge.  She reports no urinary or bowel complaints or blood stools.\par \par Reviewed last mammogram/br sono \par Reviewed last pap smear \par Reviewed last pelvic sonogram from \par Reviewed and assessed need for colon screening sched \par Reviewed patient's current medications and allergies\par \par Denies changes in medical status, medications, serious illness, hospitalizations and surgeries\par \par No Fam hx of Breast and  Ovarian Ca PGF colon ca PGM uterine ca

## 2021-11-11 NOTE — PLAN
[FreeTextEntry1] : . EILEEN KING 46 year old female \par \par Routine Gyn Exam:\par BSE taught\par Pap/HPV conducted**\par Rx given for mammogram and breast sonogram \par \par Advised pt. to schedule colonoscopy**\par RTO in 1 year or PRN\par

## 2021-11-12 LAB — HPV HIGH+LOW RISK DNA PNL CVX: NOT DETECTED

## 2021-11-16 LAB — CYTOLOGY CVX/VAG DOC THIN PREP: NORMAL

## 2022-07-07 ENCOUNTER — APPOINTMENT (OUTPATIENT)
Dept: MAMMOGRAPHY | Facility: IMAGING CENTER | Age: 47
End: 2022-07-07

## 2022-07-07 ENCOUNTER — RESULT REVIEW (OUTPATIENT)
Age: 47
End: 2022-07-07

## 2022-07-07 ENCOUNTER — APPOINTMENT (OUTPATIENT)
Dept: ULTRASOUND IMAGING | Facility: IMAGING CENTER | Age: 47
End: 2022-07-07

## 2022-07-07 ENCOUNTER — OUTPATIENT (OUTPATIENT)
Dept: OUTPATIENT SERVICES | Facility: HOSPITAL | Age: 47
LOS: 1 days | End: 2022-07-07
Payer: COMMERCIAL

## 2022-07-07 DIAGNOSIS — Z00.8 ENCOUNTER FOR OTHER GENERAL EXAMINATION: ICD-10-CM

## 2022-07-07 DIAGNOSIS — Z98.890 OTHER SPECIFIED POSTPROCEDURAL STATES: Chronic | ICD-10-CM

## 2022-07-07 PROCEDURE — 77063 BREAST TOMOSYNTHESIS BI: CPT | Mod: 26

## 2022-07-07 PROCEDURE — 77067 SCR MAMMO BI INCL CAD: CPT | Mod: 26

## 2022-07-07 PROCEDURE — 77063 BREAST TOMOSYNTHESIS BI: CPT

## 2022-07-07 PROCEDURE — 76641 ULTRASOUND BREAST COMPLETE: CPT | Mod: 26,50

## 2022-07-07 PROCEDURE — 76641 ULTRASOUND BREAST COMPLETE: CPT

## 2022-07-07 PROCEDURE — 77067 SCR MAMMO BI INCL CAD: CPT

## 2022-07-13 ENCOUNTER — OUTPATIENT (OUTPATIENT)
Dept: OUTPATIENT SERVICES | Facility: HOSPITAL | Age: 47
LOS: 1 days | End: 2022-07-13
Payer: COMMERCIAL

## 2022-07-13 ENCOUNTER — RESULT REVIEW (OUTPATIENT)
Age: 47
End: 2022-07-13

## 2022-07-13 ENCOUNTER — APPOINTMENT (OUTPATIENT)
Dept: ULTRASOUND IMAGING | Facility: IMAGING CENTER | Age: 47
End: 2022-07-13

## 2022-07-13 DIAGNOSIS — Z98.890 OTHER SPECIFIED POSTPROCEDURAL STATES: Chronic | ICD-10-CM

## 2022-07-13 DIAGNOSIS — N63.0 UNSPECIFIED LUMP IN UNSPECIFIED BREAST: ICD-10-CM

## 2022-07-13 DIAGNOSIS — Z00.8 ENCOUNTER FOR OTHER GENERAL EXAMINATION: ICD-10-CM

## 2022-07-13 PROCEDURE — 76642 ULTRASOUND BREAST LIMITED: CPT

## 2022-07-13 PROCEDURE — 76642 ULTRASOUND BREAST LIMITED: CPT | Mod: 26,RT

## 2022-07-26 ENCOUNTER — APPOINTMENT (OUTPATIENT)
Dept: HUMAN REPRODUCTION | Facility: CLINIC | Age: 47
End: 2022-07-26

## 2022-11-28 ENCOUNTER — APPOINTMENT (OUTPATIENT)
Dept: OBGYN | Facility: CLINIC | Age: 47
End: 2022-11-28

## 2022-11-28 ENCOUNTER — RESULT REVIEW (OUTPATIENT)
Age: 47
End: 2022-11-28

## 2022-11-28 DIAGNOSIS — N63.20 UNSPECIFIED LUMP IN THE LEFT BREAST, UNSPECIFIED QUADRANT: ICD-10-CM

## 2022-11-28 PROCEDURE — 99214 OFFICE O/P EST MOD 30 MIN: CPT

## 2022-11-29 ENCOUNTER — RESULT REVIEW (OUTPATIENT)
Age: 47
End: 2022-11-29

## 2022-11-29 ENCOUNTER — OUTPATIENT (OUTPATIENT)
Dept: OUTPATIENT SERVICES | Facility: HOSPITAL | Age: 47
LOS: 1 days | End: 2022-11-29
Payer: COMMERCIAL

## 2022-11-29 ENCOUNTER — APPOINTMENT (OUTPATIENT)
Dept: MAMMOGRAPHY | Facility: IMAGING CENTER | Age: 47
End: 2022-11-29

## 2022-11-29 ENCOUNTER — APPOINTMENT (OUTPATIENT)
Dept: ULTRASOUND IMAGING | Facility: IMAGING CENTER | Age: 47
End: 2022-11-29

## 2022-11-29 DIAGNOSIS — N63.20 UNSPECIFIED LUMP IN THE LEFT BREAST, UNSPECIFIED QUADRANT: ICD-10-CM

## 2022-11-29 DIAGNOSIS — Z98.890 OTHER SPECIFIED POSTPROCEDURAL STATES: Chronic | ICD-10-CM

## 2022-11-29 PROCEDURE — 77065 DX MAMMO INCL CAD UNI: CPT | Mod: 26,LT

## 2022-11-29 PROCEDURE — 76641 ULTRASOUND BREAST COMPLETE: CPT

## 2022-11-29 PROCEDURE — 76641 ULTRASOUND BREAST COMPLETE: CPT | Mod: 26,LT

## 2022-11-29 PROCEDURE — G0279: CPT | Mod: 26

## 2022-11-29 PROCEDURE — 77065 DX MAMMO INCL CAD UNI: CPT

## 2022-11-29 PROCEDURE — G0279: CPT

## 2022-12-01 ENCOUNTER — APPOINTMENT (OUTPATIENT)
Dept: OBGYN | Facility: CLINIC | Age: 47
End: 2022-12-01

## 2023-01-11 ENCOUNTER — APPOINTMENT (OUTPATIENT)
Dept: ULTRASOUND IMAGING | Facility: IMAGING CENTER | Age: 48
End: 2023-01-11
Payer: COMMERCIAL

## 2023-01-11 ENCOUNTER — RESULT REVIEW (OUTPATIENT)
Age: 48
End: 2023-01-11

## 2023-01-11 ENCOUNTER — OUTPATIENT (OUTPATIENT)
Dept: OUTPATIENT SERVICES | Facility: HOSPITAL | Age: 48
LOS: 1 days | End: 2023-01-11
Payer: COMMERCIAL

## 2023-01-11 DIAGNOSIS — Z98.890 OTHER SPECIFIED POSTPROCEDURAL STATES: Chronic | ICD-10-CM

## 2023-01-11 DIAGNOSIS — Z00.8 ENCOUNTER FOR OTHER GENERAL EXAMINATION: ICD-10-CM

## 2023-01-11 PROCEDURE — 76642 ULTRASOUND BREAST LIMITED: CPT | Mod: 26,RT

## 2023-01-11 PROCEDURE — 76642 ULTRASOUND BREAST LIMITED: CPT

## 2023-01-11 PROCEDURE — 19000 PUNCTURE ASPIR CYST BREAST: CPT

## 2023-01-11 PROCEDURE — 76942 ECHO GUIDE FOR BIOPSY: CPT

## 2023-01-11 PROCEDURE — 19000 PUNCTURE ASPIR CYST BREAST: CPT | Mod: LT

## 2023-01-11 PROCEDURE — 88173 CYTOPATH EVAL FNA REPORT: CPT

## 2023-01-11 PROCEDURE — 88173 CYTOPATH EVAL FNA REPORT: CPT | Mod: 26

## 2023-01-11 PROCEDURE — 88305 TISSUE EXAM BY PATHOLOGIST: CPT | Mod: 26

## 2023-01-11 PROCEDURE — 88305 TISSUE EXAM BY PATHOLOGIST: CPT

## 2023-01-11 PROCEDURE — 76942 ECHO GUIDE FOR BIOPSY: CPT | Mod: 26,59

## 2023-01-13 ENCOUNTER — NON-APPOINTMENT (OUTPATIENT)
Age: 48
End: 2023-01-13

## 2023-01-13 ENCOUNTER — TRANSCRIPTION ENCOUNTER (OUTPATIENT)
Age: 48
End: 2023-01-13

## 2023-01-13 LAB — NON-GYNECOLOGICAL CYTOLOGY STUDY: SIGNIFICANT CHANGE UP

## 2023-01-17 ENCOUNTER — ASOB RESULT (OUTPATIENT)
Age: 48
End: 2023-01-17

## 2023-01-17 ENCOUNTER — APPOINTMENT (OUTPATIENT)
Dept: OBGYN | Facility: CLINIC | Age: 48
End: 2023-01-17
Payer: COMMERCIAL

## 2023-01-17 PROCEDURE — 76830 TRANSVAGINAL US NON-OB: CPT

## 2023-01-23 ENCOUNTER — APPOINTMENT (OUTPATIENT)
Dept: OBGYN | Facility: CLINIC | Age: 48
End: 2023-01-23
Payer: COMMERCIAL

## 2023-01-23 VITALS
SYSTOLIC BLOOD PRESSURE: 131 MMHG | BODY MASS INDEX: 20.55 KG/M2 | HEIGHT: 63 IN | DIASTOLIC BLOOD PRESSURE: 79 MMHG | WEIGHT: 116 LBS

## 2023-01-23 DIAGNOSIS — Z01.419 ENCOUNTER FOR GYNECOLOGICAL EXAMINATION (GENERAL) (ROUTINE) W/OUT ABNORMAL FINDINGS: ICD-10-CM

## 2023-01-23 DIAGNOSIS — N90.89 OTHER SPECIFIED NONINFLAMMATORY DISORDERS OF VULVA AND PERINEUM: ICD-10-CM

## 2023-01-23 DIAGNOSIS — Z12.39 ENCOUNTER FOR OTHER SCREENING FOR MALIGNANT NEOPLASM OF BREAST: ICD-10-CM

## 2023-01-23 PROCEDURE — 99396 PREV VISIT EST AGE 40-64: CPT

## 2023-01-23 PROCEDURE — 82270 OCCULT BLOOD FECES: CPT

## 2023-01-24 LAB — HPV HIGH+LOW RISK DNA PNL CVX: NOT DETECTED

## 2023-01-26 LAB — CYTOLOGY CVX/VAG DOC THIN PREP: NORMAL

## 2023-07-10 DIAGNOSIS — R92.2 INCONCLUSIVE MAMMOGRAM: ICD-10-CM

## 2023-07-12 ENCOUNTER — RESULT REVIEW (OUTPATIENT)
Age: 48
End: 2023-07-12

## 2023-07-12 ENCOUNTER — APPOINTMENT (OUTPATIENT)
Dept: MAMMOGRAPHY | Facility: IMAGING CENTER | Age: 48
End: 2023-07-12
Payer: COMMERCIAL

## 2023-07-12 ENCOUNTER — OUTPATIENT (OUTPATIENT)
Dept: OUTPATIENT SERVICES | Facility: HOSPITAL | Age: 48
LOS: 1 days | End: 2023-07-12
Payer: COMMERCIAL

## 2023-07-12 ENCOUNTER — APPOINTMENT (OUTPATIENT)
Dept: ULTRASOUND IMAGING | Facility: IMAGING CENTER | Age: 48
End: 2023-07-12
Payer: COMMERCIAL

## 2023-07-12 DIAGNOSIS — Z98.890 OTHER SPECIFIED POSTPROCEDURAL STATES: Chronic | ICD-10-CM

## 2023-07-12 DIAGNOSIS — R92.2 INCONCLUSIVE MAMMOGRAM: ICD-10-CM

## 2023-07-12 DIAGNOSIS — N63.0 UNSPECIFIED LUMP IN UNSPECIFIED BREAST: ICD-10-CM

## 2023-07-12 DIAGNOSIS — Z12.39 ENCOUNTER FOR OTHER SCREENING FOR MALIGNANT NEOPLASM OF BREAST: ICD-10-CM

## 2023-07-12 PROCEDURE — 77066 DX MAMMO INCL CAD BI: CPT

## 2023-07-12 PROCEDURE — 77063 BREAST TOMOSYNTHESIS BI: CPT | Mod: 26

## 2023-07-12 PROCEDURE — 76641 ULTRASOUND BREAST COMPLETE: CPT

## 2023-07-12 PROCEDURE — 76641 ULTRASOUND BREAST COMPLETE: CPT | Mod: 26,50

## 2023-07-12 PROCEDURE — 77067 SCR MAMMO BI INCL CAD: CPT | Mod: 26

## 2023-07-12 PROCEDURE — 77063 BREAST TOMOSYNTHESIS BI: CPT

## 2023-07-12 PROCEDURE — 77067 SCR MAMMO BI INCL CAD: CPT

## 2023-07-12 PROCEDURE — G0279: CPT

## 2024-04-02 ENCOUNTER — ASOB RESULT (OUTPATIENT)
Age: 49
End: 2024-04-02

## 2024-04-02 ENCOUNTER — APPOINTMENT (OUTPATIENT)
Dept: OBGYN | Facility: CLINIC | Age: 49
End: 2024-04-02
Payer: COMMERCIAL

## 2024-04-02 VITALS
SYSTOLIC BLOOD PRESSURE: 125 MMHG | DIASTOLIC BLOOD PRESSURE: 84 MMHG | HEIGHT: 63 IN | WEIGHT: 131 LBS | BODY MASS INDEX: 23.21 KG/M2

## 2024-04-02 DIAGNOSIS — Z01.419 ENCOUNTER FOR GYNECOLOGICAL EXAMINATION (GENERAL) (ROUTINE) W/OUT ABNORMAL FINDINGS: ICD-10-CM

## 2024-04-02 PROCEDURE — 99396 PREV VISIT EST AGE 40-64: CPT

## 2024-04-02 PROCEDURE — 82270 OCCULT BLOOD FECES: CPT

## 2024-04-03 LAB
CYTOLOGY CVX/VAG DOC THIN PREP: NORMAL
HPV HIGH+LOW RISK DNA PNL CVX: NOT DETECTED

## 2024-04-08 LAB — CYTOLOGY CVX/VAG DOC THIN PREP: NORMAL

## 2024-04-17 ENCOUNTER — NON-APPOINTMENT (OUTPATIENT)
Age: 49
End: 2024-04-17

## 2024-04-19 ENCOUNTER — NON-APPOINTMENT (OUTPATIENT)
Age: 49
End: 2024-04-19

## 2024-04-19 ENCOUNTER — APPOINTMENT (OUTPATIENT)
Dept: CARDIOLOGY | Facility: CLINIC | Age: 49
End: 2024-04-19
Payer: COMMERCIAL

## 2024-04-19 VITALS — DIASTOLIC BLOOD PRESSURE: 87 MMHG | HEART RATE: 109 BPM | SYSTOLIC BLOOD PRESSURE: 127 MMHG | OXYGEN SATURATION: 99 %

## 2024-04-19 DIAGNOSIS — Z82.3 FAMILY HISTORY OF STROKE: ICD-10-CM

## 2024-04-19 DIAGNOSIS — Z91.89 OTHER SPECIFIED PERSONAL RISK FACTORS, NOT ELSEWHERE CLASSIFIED: ICD-10-CM

## 2024-04-19 DIAGNOSIS — R00.2 PALPITATIONS: ICD-10-CM

## 2024-04-19 PROCEDURE — 93000 ELECTROCARDIOGRAM COMPLETE: CPT

## 2024-04-19 PROCEDURE — 99204 OFFICE O/P NEW MOD 45 MIN: CPT | Mod: 25

## 2024-04-19 RX ORDER — ALPRAZOLAM 0.25 MG/1
0.25 TABLET ORAL 3 TIMES DAILY
Refills: 0 | Status: ACTIVE | COMMUNITY
Start: 2024-04-19

## 2024-04-30 LAB
25(OH)D3 SERPL-MCNC: 37.1 NG/ML
ANION GAP SERPL CALC-SCNC: 11 MMOL/L
BUN SERPL-MCNC: 12 MG/DL
CALCIUM SERPL-MCNC: 9.2 MG/DL
CHLORIDE SERPL-SCNC: 104 MMOL/L
CHOLEST SERPL-MCNC: 184 MG/DL
CO2 SERPL-SCNC: 24 MMOL/L
CREAT SERPL-MCNC: 0.65 MG/DL
EGFR: 108 ML/MIN/1.73M2
ESTIMATED AVERAGE GLUCOSE: 111 MG/DL
GLUCOSE SERPL-MCNC: 107 MG/DL
HBA1C MFR BLD HPLC: 5.5 %
HCT VFR BLD CALC: 41.1 %
HDLC SERPL-MCNC: 67 MG/DL
HGB BLD-MCNC: 13.9 G/DL
LDLC SERPL CALC-MCNC: 103 MG/DL
MCHC RBC-ENTMCNC: 29.8 PG
MCHC RBC-ENTMCNC: 33.8 GM/DL
MCV RBC AUTO: 88 FL
NONHDLC SERPL-MCNC: 117 MG/DL
PLATELET # BLD AUTO: 311 K/UL
POTASSIUM SERPL-SCNC: 4 MMOL/L
RBC # BLD: 4.67 M/UL
RBC # FLD: 13 %
SODIUM SERPL-SCNC: 139 MMOL/L
TRIGL SERPL-MCNC: 78 MG/DL
TSH SERPL-ACNC: 1.6 UIU/ML
WBC # FLD AUTO: 6.08 K/UL

## 2024-07-23 ENCOUNTER — RESULT REVIEW (OUTPATIENT)
Age: 49
End: 2024-07-23

## 2024-07-23 ENCOUNTER — APPOINTMENT (OUTPATIENT)
Dept: MAMMOGRAPHY | Facility: IMAGING CENTER | Age: 49
End: 2024-07-23
Payer: COMMERCIAL

## 2024-07-23 ENCOUNTER — APPOINTMENT (OUTPATIENT)
Dept: ULTRASOUND IMAGING | Facility: IMAGING CENTER | Age: 49
End: 2024-07-23
Payer: COMMERCIAL

## 2024-07-23 PROCEDURE — 76641 ULTRASOUND BREAST COMPLETE: CPT | Mod: 26,50

## 2024-07-23 PROCEDURE — 77063 BREAST TOMOSYNTHESIS BI: CPT | Mod: 26

## 2024-07-23 PROCEDURE — 77067 SCR MAMMO BI INCL CAD: CPT | Mod: 26

## 2024-07-31 ENCOUNTER — APPOINTMENT (OUTPATIENT)
Dept: CV DIAGNOSITCS | Facility: HOSPITAL | Age: 49
End: 2024-07-31

## 2024-07-31 ENCOUNTER — APPOINTMENT (OUTPATIENT)
Dept: CV DIAGNOSTICS | Facility: HOSPITAL | Age: 49
End: 2024-07-31

## 2024-08-06 ENCOUNTER — NON-APPOINTMENT (OUTPATIENT)
Age: 49
End: 2024-08-06

## 2024-08-07 ENCOUNTER — NON-APPOINTMENT (OUTPATIENT)
Age: 49
End: 2024-08-07

## 2024-08-07 ENCOUNTER — APPOINTMENT (OUTPATIENT)
Dept: OTOLARYNGOLOGY | Facility: CLINIC | Age: 49
End: 2024-08-07

## 2024-08-07 PROBLEM — H69.90 EUSTACHIAN TUBE DYSFUNCTION: Status: ACTIVE | Noted: 2024-08-07

## 2024-08-07 PROBLEM — J34.2 NASAL SEPTAL DEVIATION: Status: ACTIVE | Noted: 2024-08-07

## 2024-08-07 PROBLEM — J31.0 CHRONIC RHINITIS: Status: ACTIVE | Noted: 2024-08-07

## 2024-08-07 PROCEDURE — 92557 COMPREHENSIVE HEARING TEST: CPT

## 2024-08-07 PROCEDURE — 92567 TYMPANOMETRY: CPT

## 2024-08-07 PROCEDURE — 99204 OFFICE O/P NEW MOD 45 MIN: CPT | Mod: 25

## 2024-08-07 PROCEDURE — 31231 NASAL ENDOSCOPY DX: CPT

## 2024-08-07 NOTE — HISTORY OF PRESENT ILLNESS
[de-identified] : Wang Carrasco is a 47 yo female who presents for evaluation of right aural fullness. She states that last September 2023, she developed right aural fullness and right otitis media, and was treated with antibiotics followed by tympanostomy tube placement. this was then removed in Dec 2023. She notes that since then, she has had chronic right aural fullness. She had a URI a few weeks ago, and then developed right aural fullness. She was treated with oral steroids which has improved the fullness. She notes chronic issues with right maxillary sinus pressure. She notes intermittent nasal congestion. She denies rhinorrhea or postnasal drainage. No reent fevers. Denies recurrent sinusitis or allergies. She has been using flonase.  of note, she is a .

## 2024-08-07 NOTE — REVIEW OF SYSTEMS
[Hearing Loss] : hearing loss [Recurrent Ear Infections] : recurrent ear infections [Ear Noises] : ear noises [Sinus Pain] : sinus pain [Sinus Pressure] : sinus pressure [Anxiety] : anxiety [Negative] : Heme/Lymph

## 2024-08-07 NOTE — ASSESSMENT
[FreeTextEntry1] : Wang Carrasco presents for evaluation. She has history of chronic rhinitis with right sided sinusitis symptoms. she has had issues with right aural fullness, with previous right tympanostomy tube placement. She has been on flonase.  Otoscopic exam today is normal. Sinonasal endoscopy was performed revealing bilateral septal deviation. Audiogram was performed and reviewed showing type As tymp AD, type A tymp AS, and normal hearing AU. Will add azelastine to her regimen. When she returns from her trip, will start chronic sinusitis workup.  - start fluticasone 2 sprays to each nostril BID x 1 mo. - start azelastine 2 sprays to each nostril BID x 1 mo. - afrin when ascending and descending in flights. - f/u in 3 weeks.

## 2024-08-21 ENCOUNTER — APPOINTMENT (OUTPATIENT)
Dept: OTOLARYNGOLOGY | Facility: CLINIC | Age: 49
End: 2024-08-21
Payer: COMMERCIAL

## 2024-08-21 VITALS
HEART RATE: 83 BPM | DIASTOLIC BLOOD PRESSURE: 78 MMHG | HEIGHT: 63 IN | BODY MASS INDEX: 23.04 KG/M2 | WEIGHT: 130 LBS | SYSTOLIC BLOOD PRESSURE: 123 MMHG

## 2024-08-21 DIAGNOSIS — J32.9 CHRONIC SINUSITIS, UNSPECIFIED: ICD-10-CM

## 2024-08-21 DIAGNOSIS — H69.90 UNSPECIFIED EUSTACHIAN TUBE DISORDER, UNSPECIFIED EAR: ICD-10-CM

## 2024-08-21 DIAGNOSIS — J31.0 CHRONIC RHINITIS: ICD-10-CM

## 2024-08-21 DIAGNOSIS — J34.2 DEVIATED NASAL SEPTUM: ICD-10-CM

## 2024-08-21 PROCEDURE — 99213 OFFICE O/P EST LOW 20 MIN: CPT

## 2024-08-21 RX ORDER — AMOXICILLIN AND CLAVULANATE POTASSIUM 875; 125 MG/1; MG/1
875-125 TABLET, COATED ORAL
Qty: 20 | Refills: 0 | Status: ACTIVE | COMMUNITY
Start: 2024-08-21 | End: 1900-01-01

## 2024-08-21 NOTE — ASSESSMENT
[FreeTextEntry1] : Wang Carrasco presents for follow-up. She has history of chronic rhinitis with right sided sinusitis symptoms. she has had issues with right aural fullness, with previous right tympanostomy tube placement. She has been on flonase.  Sinonasal endoscopy was performed at last visit revealing bilateral septal deviation. Audiogram was performed and reviewed at last visit showing type As tymp AD, type A tymp AS, and normal hearing AU. She has been on fluticasone and azelastine sprays. Will start augmentin to complete maximal medical therapy, then obtain CT sinus in office at next visit.  - continue fluticasone 2 sprays to each nostril BID - continue azelastine 2 sprays to each nostril BID - start augmentin x 10 days. Side effects were discussed and include but are not limited to nausea, vomiting, diarrhea, and skin rash. - f/u after antibiotic course for CT sinus in office.

## 2024-08-21 NOTE — REASON FOR VISIT
[Subsequent Evaluation] : a subsequent evaluation for [FreeTextEntry2] : Sinusitis, chronic rhinitis

## 2024-08-21 NOTE — HISTORY OF PRESENT ILLNESS
[de-identified] : Wang Carrasco is a 49 yo female who presents for evaluation of right aural fullness. She states that last September 2023, she developed right aural fullness and right otitis media, and was treated with antibiotics followed by tympanostomy tube placement. this was then removed in Dec 2023. She notes that since then, she has had chronic right aural fullness. She had a URI a few weeks ago, and then developed right aural fullness. She was treated with oral steroids which has improved the fullness. She notes chronic issues with right maxillary sinus pressure. She notes intermittent nasal congestion. She denies rhinorrhea or postnasal drainage. No recent fevers. Denies recurrent sinusitis or allergies. She has been using flonase.  of note, she is a . [FreeTextEntry1] : 8/21/24 - Wang Carrasco presents for follow-up. She has been on fluticasone and azelastine sprays. She notes right aural fullness. She notes right maxillary sinus pressure. She denies rhinorrhea or postnasal drainage. No recent fevers.

## 2024-09-04 ENCOUNTER — APPOINTMENT (OUTPATIENT)
Dept: OTOLARYNGOLOGY | Facility: CLINIC | Age: 49
End: 2024-09-04
Payer: COMMERCIAL

## 2024-09-04 ENCOUNTER — RESULT REVIEW (OUTPATIENT)
Age: 49
End: 2024-09-04

## 2024-09-04 VITALS
HEIGHT: 63 IN | BODY MASS INDEX: 23.04 KG/M2 | DIASTOLIC BLOOD PRESSURE: 85 MMHG | WEIGHT: 130 LBS | SYSTOLIC BLOOD PRESSURE: 125 MMHG | HEART RATE: 90 BPM

## 2024-09-04 DIAGNOSIS — H69.90 UNSPECIFIED EUSTACHIAN TUBE DISORDER, UNSPECIFIED EAR: ICD-10-CM

## 2024-09-04 DIAGNOSIS — J34.2 DEVIATED NASAL SEPTUM: ICD-10-CM

## 2024-09-04 DIAGNOSIS — J31.0 CHRONIC RHINITIS: ICD-10-CM

## 2024-09-04 PROCEDURE — 70486 CT MAXILLOFACIAL W/O DYE: CPT

## 2024-09-04 PROCEDURE — 99214 OFFICE O/P EST MOD 30 MIN: CPT

## 2024-09-04 NOTE — DATA REVIEWED
[de-identified] : CT sinus performed in office and reviewed 9/4/24: Bilateral septal deviation with osseous spur to left. No significant sinus disease.

## 2024-09-04 NOTE — DATA REVIEWED
[de-identified] : CT sinus performed in office and reviewed 9/4/24: Bilateral septal deviation with osseous spur to left. No significant sinus disease.

## 2024-09-04 NOTE — DATA REVIEWED
[de-identified] : CT sinus performed in office and reviewed 9/4/24: Bilateral septal deviation with osseous spur to left. No significant sinus disease.

## 2024-09-04 NOTE — HISTORY OF PRESENT ILLNESS
[de-identified] : Wang Carrasco is a 49 yo female who presents for evaluation of right aural fullness. She states that last September 2023, she developed right aural fullness and right otitis media, and was treated with antibiotics followed by tympanostomy tube placement. this was then removed in Dec 2023. She notes that since then, she has had chronic right aural fullness. She had a URI a few weeks ago, and then developed right aural fullness. She was treated with oral steroids which has improved the fullness. She notes chronic issues with right maxillary sinus pressure. She notes intermittent nasal congestion. She denies rhinorrhea or postnasal drainage. No recent fevers. Denies recurrent sinusitis or allergies. She has been using flonase.  of note, she is a . [FreeTextEntry1] : 8/21/24 - Wang Carrasco presents for follow-up. She has been on fluticasone and azelastine sprays. She notes right aural fullness. She notes right maxillary sinus pressure. She denies rhinorrhea or postnasal drainage. No recent fevers.  9/4/24 - Ms. Carrasco presents for follow-up. She completed maximal medical therapy for sinusitis. She denies nasal congestion, rhinorrhea, postnasal drainage, or sinus pressure. She notes that right aural fullness is improved. Denies recent fevers.

## 2024-09-04 NOTE — PHYSICAL EXAM
[] : septum deviated bilaterally [de-identified] : Bilateral inferior turbinate hypertrophy. [Midline] : trachea located in midline position [Normal] : no rashes

## 2024-09-04 NOTE — HISTORY OF PRESENT ILLNESS
[de-identified] : Wang Carrasco is a 49 yo female who presents for evaluation of right aural fullness. She states that last September 2023, she developed right aural fullness and right otitis media, and was treated with antibiotics followed by tympanostomy tube placement. this was then removed in Dec 2023. She notes that since then, she has had chronic right aural fullness. She had a URI a few weeks ago, and then developed right aural fullness. She was treated with oral steroids which has improved the fullness. She notes chronic issues with right maxillary sinus pressure. She notes intermittent nasal congestion. She denies rhinorrhea or postnasal drainage. No recent fevers. Denies recurrent sinusitis or allergies. She has been using flonase.  of note, she is a . [FreeTextEntry1] : 8/21/24 - Wang Carrasco presents for follow-up. She has been on fluticasone and azelastine sprays. She notes right aural fullness. She notes right maxillary sinus pressure. She denies rhinorrhea or postnasal drainage. No recent fevers.  9/4/24 - Ms. Carrasco presents for follow-up. She completed maximal medical therapy for sinusitis. She denies nasal congestion, rhinorrhea, postnasal drainage, or sinus pressure. She notes that right aural fullness is improved. Denies recent fevers.

## 2024-09-04 NOTE — PHYSICAL EXAM
[] : septum deviated bilaterally [de-identified] : Bilateral inferior turbinate hypertrophy. [Midline] : trachea located in midline position [Normal] : no rashes

## 2024-09-04 NOTE — ASSESSMENT
[FreeTextEntry1] : Wang Carrasco presents for follow-up. She has history of chronic rhinitis with right sided sinusitis symptoms. She has had issues with right aural fullness, with previous right tympanostomy tube placement.  Sinonasal endoscopy was performed at previous visit revealing bilateral septal deviation. Audiogram was performed and reviewed at previous visit showing type As tymp AD, type A tymp AS, and normal hearing AU. She completed maximal medical therapy for sinusitis with fluticasone, azelastine, and augmentin. She notes improvement in sinonasal symptoms and aural fullness. CT sinus was performed in office and reviewed showing bilateral septal deviation but no significant sinus disease.   We discussed septoplasty, smr of bilateral inferior turbinates, and right eustachian tube dilation. R/b/a discussed. Risk of surgery were discussed with the patient including but not limited to bleeding, infection, septal perforation, numbness of the front two teeth, empty nose syndrome, and loss of sense of smell. Unique concerns related to septal perforation were discussed including mucus crusting, pain, whistling with breathing, and bleeding. Challenges of management of septal perforation were reiterated. The patient understands these risks but wishes to defer surgery for now.  - continue fluticasone and azelastine prn - f/u in 3 mo

## 2024-09-04 NOTE — PHYSICAL EXAM
[] : septum deviated bilaterally [de-identified] : Bilateral inferior turbinate hypertrophy. [Midline] : trachea located in midline position [Normal] : no rashes

## 2024-09-04 NOTE — HISTORY OF PRESENT ILLNESS
[de-identified] : Wang Carrasco is a 47 yo female who presents for evaluation of right aural fullness. She states that last September 2023, she developed right aural fullness and right otitis media, and was treated with antibiotics followed by tympanostomy tube placement. this was then removed in Dec 2023. She notes that since then, she has had chronic right aural fullness. She had a URI a few weeks ago, and then developed right aural fullness. She was treated with oral steroids which has improved the fullness. She notes chronic issues with right maxillary sinus pressure. She notes intermittent nasal congestion. She denies rhinorrhea or postnasal drainage. No recent fevers. Denies recurrent sinusitis or allergies. She has been using flonase.  of note, she is a . [FreeTextEntry1] : 8/21/24 - Wang Carrasco presents for follow-up. She has been on fluticasone and azelastine sprays. She notes right aural fullness. She notes right maxillary sinus pressure. She denies rhinorrhea or postnasal drainage. No recent fevers.  9/4/24 - Ms. Carrasco presents for follow-up. She completed maximal medical therapy for sinusitis. She denies nasal congestion, rhinorrhea, postnasal drainage, or sinus pressure. She notes that right aural fullness is improved. Denies recent fevers.

## 2024-09-11 ENCOUNTER — NON-APPOINTMENT (OUTPATIENT)
Age: 49
End: 2024-09-11

## 2024-09-13 DIAGNOSIS — J34.89 OTHER SPECIFIED DISORDERS OF NOSE AND NASAL SINUSES: ICD-10-CM

## 2024-10-08 ENCOUNTER — RX RENEWAL (OUTPATIENT)
Age: 49
End: 2024-10-08

## 2024-10-18 ENCOUNTER — APPOINTMENT (OUTPATIENT)
Dept: CARDIOLOGY | Facility: CLINIC | Age: 49
End: 2024-10-18

## 2025-01-13 ENCOUNTER — APPOINTMENT (OUTPATIENT)
Dept: PEDIATRIC ALLERGY IMMUNOLOGY | Facility: CLINIC | Age: 50
End: 2025-01-13
Payer: COMMERCIAL

## 2025-01-13 DIAGNOSIS — H69.90 UNSPECIFIED EUSTACHIAN TUBE DISORDER, UNSPECIFIED EAR: ICD-10-CM

## 2025-01-13 DIAGNOSIS — J34.2 DEVIATED NASAL SEPTUM: ICD-10-CM

## 2025-01-13 DIAGNOSIS — J31.0 CHRONIC RHINITIS: ICD-10-CM

## 2025-01-13 PROCEDURE — 95004 PERQ TESTS W/ALRGNC XTRCS: CPT

## 2025-01-13 PROCEDURE — 99203 OFFICE O/P NEW LOW 30 MIN: CPT | Mod: 25

## 2025-04-08 ENCOUNTER — APPOINTMENT (OUTPATIENT)
Dept: CARDIOLOGY | Facility: CLINIC | Age: 50
End: 2025-04-08

## 2025-04-08 DIAGNOSIS — R01.1 CARDIAC MURMUR, UNSPECIFIED: ICD-10-CM

## 2025-04-08 PROCEDURE — 93306 TTE W/DOPPLER COMPLETE: CPT

## 2025-04-09 ENCOUNTER — OUTPATIENT (OUTPATIENT)
Dept: OUTPATIENT SERVICES | Facility: HOSPITAL | Age: 50
LOS: 1 days | End: 2025-04-09
Payer: COMMERCIAL

## 2025-04-09 ENCOUNTER — APPOINTMENT (OUTPATIENT)
Dept: CV DIAGNOSTICS | Facility: HOSPITAL | Age: 50
End: 2025-04-09

## 2025-04-09 ENCOUNTER — RESULT REVIEW (OUTPATIENT)
Age: 50
End: 2025-04-09

## 2025-04-09 DIAGNOSIS — Z98.890 OTHER SPECIFIED POSTPROCEDURAL STATES: Chronic | ICD-10-CM

## 2025-04-09 DIAGNOSIS — R00.2 PALPITATIONS: ICD-10-CM

## 2025-04-09 PROCEDURE — 93016 CV STRESS TEST SUPVJ ONLY: CPT

## 2025-04-09 PROCEDURE — 93017 CV STRESS TEST TRACING ONLY: CPT

## 2025-04-09 PROCEDURE — 93018 CV STRESS TEST I&R ONLY: CPT

## 2025-04-20 PROBLEM — R01.1 MURMUR: Status: ACTIVE | Noted: 2025-04-20

## 2025-05-13 ENCOUNTER — APPOINTMENT (OUTPATIENT)
Dept: CARDIOLOGY | Facility: CLINIC | Age: 50
End: 2025-05-13
Payer: COMMERCIAL

## 2025-05-13 PROCEDURE — G2211 COMPLEX E/M VISIT ADD ON: CPT | Mod: NC,95

## 2025-05-13 PROCEDURE — 99214 OFFICE O/P EST MOD 30 MIN: CPT | Mod: 95

## 2025-06-16 ENCOUNTER — APPOINTMENT (OUTPATIENT)
Dept: OBGYN | Facility: CLINIC | Age: 50
End: 2025-06-16
Payer: COMMERCIAL

## 2025-06-16 ENCOUNTER — ASOB RESULT (OUTPATIENT)
Age: 50
End: 2025-06-16

## 2025-06-16 ENCOUNTER — NON-APPOINTMENT (OUTPATIENT)
Age: 50
End: 2025-06-16

## 2025-06-16 VITALS
HEIGHT: 63 IN | SYSTOLIC BLOOD PRESSURE: 126 MMHG | WEIGHT: 132 LBS | BODY MASS INDEX: 23.39 KG/M2 | DIASTOLIC BLOOD PRESSURE: 76 MMHG

## 2025-06-16 PROBLEM — Z13.31 DEPRESSION SCREEN: Status: ACTIVE | Noted: 2025-06-16

## 2025-06-16 PROBLEM — N83.202 CYST OF LEFT OVARY: Status: ACTIVE | Noted: 2025-06-16

## 2025-06-16 PROCEDURE — 99459 PELVIC EXAMINATION: CPT | Mod: NC

## 2025-06-16 PROCEDURE — G0444 DEPRESSION SCREEN ANNUAL: CPT | Mod: 59

## 2025-06-16 PROCEDURE — 76830 TRANSVAGINAL US NON-OB: CPT

## 2025-06-16 PROCEDURE — 82270 OCCULT BLOOD FECES: CPT

## 2025-06-16 PROCEDURE — 99396 PREV VISIT EST AGE 40-64: CPT

## 2025-06-17 LAB — HPV HIGH+LOW RISK DNA PNL CVX: NOT DETECTED

## 2025-06-18 LAB — CYTOLOGY CVX/VAG DOC THIN PREP: NORMAL

## 2025-06-24 ENCOUNTER — APPOINTMENT (OUTPATIENT)
Dept: ULTRASOUND IMAGING | Facility: CLINIC | Age: 50
End: 2025-06-24

## 2025-06-24 ENCOUNTER — APPOINTMENT (OUTPATIENT)
Dept: CT IMAGING | Facility: CLINIC | Age: 50
End: 2025-06-24

## 2025-07-14 ENCOUNTER — ASOB RESULT (OUTPATIENT)
Age: 50
End: 2025-07-14

## 2025-07-14 ENCOUNTER — APPOINTMENT (OUTPATIENT)
Dept: OBGYN | Facility: CLINIC | Age: 50
End: 2025-07-14
Payer: COMMERCIAL

## 2025-07-14 PROCEDURE — 76830 TRANSVAGINAL US NON-OB: CPT

## 2025-08-19 ENCOUNTER — ASOB RESULT (OUTPATIENT)
Age: 50
End: 2025-08-19

## 2025-08-19 ENCOUNTER — APPOINTMENT (OUTPATIENT)
Dept: OBGYN | Facility: CLINIC | Age: 50
End: 2025-08-19
Payer: COMMERCIAL

## 2025-08-19 ENCOUNTER — NON-APPOINTMENT (OUTPATIENT)
Age: 50
End: 2025-08-19

## 2025-08-19 PROCEDURE — 77080 DXA BONE DENSITY AXIAL: CPT

## 2025-08-19 PROCEDURE — 76830 TRANSVAGINAL US NON-OB: CPT
